# Patient Record
Sex: FEMALE | Race: OTHER | Employment: OTHER | URBAN - METROPOLITAN AREA
[De-identification: names, ages, dates, MRNs, and addresses within clinical notes are randomized per-mention and may not be internally consistent; named-entity substitution may affect disease eponyms.]

---

## 2018-03-17 ENCOUNTER — HOSPITAL ENCOUNTER (OUTPATIENT)
Age: 58
Discharge: HOME | End: 2018-03-17
Payer: COMMERCIAL

## 2018-03-17 DIAGNOSIS — E78.00 HYPERCHOLESTEROLEMIA: ICD-10-CM

## 2018-03-17 LAB
ABSOLUTE EOS #: 0 K/UL (ref 0–0.4)
ABSOLUTE IMMATURE GRANULOCYTE: NORMAL K/UL (ref 0–0.3)
ABSOLUTE LYMPH #: 2.2 K/UL (ref 1–4.8)
ABSOLUTE MONO #: 0.4 K/UL (ref 0.1–1.3)
ALBUMIN SERPL-MCNC: 4.4 G/DL (ref 3.5–5.2)
ALBUMIN/GLOBULIN RATIO: ABNORMAL (ref 1–2.5)
ALP BLD-CCNC: 75 U/L (ref 35–104)
ALT SERPL-CCNC: 12 U/L (ref 5–33)
ANION GAP SERPL CALCULATED.3IONS-SCNC: 11 MMOL/L (ref 9–17)
AST SERPL-CCNC: 18 U/L
BASOPHILS # BLD: 0 % (ref 0–2)
BASOPHILS ABSOLUTE: 0 K/UL (ref 0–0.2)
BILIRUB SERPL-MCNC: 0.35 MG/DL (ref 0.3–1.2)
BUN BLDV-MCNC: 19 MG/DL (ref 6–20)
BUN/CREAT BLD: ABNORMAL (ref 9–20)
CALCIUM SERPL-MCNC: 9.1 MG/DL (ref 8.6–10.4)
CHLORIDE BLD-SCNC: 101 MMOL/L (ref 98–107)
CHOLESTEROL/HDL RATIO: 3.7
CHOLESTEROL: 202 MG/DL
CO2: 29 MMOL/L (ref 20–31)
CREAT SERPL-MCNC: 0.64 MG/DL (ref 0.5–0.9)
DIFFERENTIAL TYPE: NORMAL
EOSINOPHILS RELATIVE PERCENT: 1 % (ref 0–4)
GFR AFRICAN AMERICAN: >60 ML/MIN
GFR NON-AFRICAN AMERICAN: >60 ML/MIN
GFR SERPL CREATININE-BSD FRML MDRD: ABNORMAL ML/MIN/{1.73_M2}
GFR SERPL CREATININE-BSD FRML MDRD: ABNORMAL ML/MIN/{1.73_M2}
GLUCOSE BLD-MCNC: 101 MG/DL (ref 70–99)
HCT VFR BLD CALC: 39.1 % (ref 36–46)
HDLC SERPL-MCNC: 55 MG/DL
HEMOGLOBIN: 12.8 G/DL (ref 12–16)
IMMATURE GRANULOCYTES: NORMAL %
LDL CHOLESTEROL: 125 MG/DL (ref 0–130)
LYMPHOCYTES # BLD: 43 % (ref 24–44)
MCH RBC QN AUTO: 27.9 PG (ref 26–34)
MCHC RBC AUTO-ENTMCNC: 32.8 G/DL (ref 31–37)
MCV RBC AUTO: 85 FL (ref 80–100)
MONOCYTES # BLD: 7 % (ref 1–7)
NRBC AUTOMATED: NORMAL PER 100 WBC
PDW BLD-RTO: 13.7 % (ref 11.5–14.9)
PLATELET # BLD: 353 K/UL (ref 150–450)
PLATELET ESTIMATE: NORMAL
PMV BLD AUTO: 8.4 FL (ref 6–12)
POTASSIUM SERPL-SCNC: 4.4 MMOL/L (ref 3.7–5.3)
RBC # BLD: 4.59 M/UL (ref 4–5.2)
RBC # BLD: NORMAL 10*6/UL
SEG NEUTROPHILS: 49 % (ref 36–66)
SEGMENTED NEUTROPHILS ABSOLUTE COUNT: 2.5 K/UL (ref 1.3–9.1)
SODIUM BLD-SCNC: 141 MMOL/L (ref 135–144)
TOTAL PROTEIN: 7.5 G/DL (ref 6.4–8.3)
TRIGL SERPL-MCNC: 109 MG/DL
VLDLC SERPL CALC-MCNC: ABNORMAL MG/DL (ref 1–30)
WBC # BLD: 5.1 K/UL (ref 3.5–11)
WBC # BLD: NORMAL 10*3/UL

## 2018-03-17 PROCEDURE — 85025 COMPLETE CBC W/AUTO DIFF WBC: CPT

## 2018-03-17 PROCEDURE — 80061 LIPID PANEL: CPT

## 2018-03-17 PROCEDURE — 36415 COLL VENOUS BLD VENIPUNCTURE: CPT

## 2018-03-17 PROCEDURE — 80053 COMPREHEN METABOLIC PANEL: CPT

## 2018-04-05 ENCOUNTER — HOSPITAL ENCOUNTER (OUTPATIENT)
Dept: WOMENS IMAGING | Age: 58
Discharge: HOME | End: 2018-04-07
Payer: COMMERCIAL

## 2018-04-05 DIAGNOSIS — Z12.31 SCREENING MAMMOGRAM, ENCOUNTER FOR: ICD-10-CM

## 2018-04-05 PROCEDURE — 77063 BREAST TOMOSYNTHESIS BI: CPT

## 2019-03-06 ENCOUNTER — HOSPITAL ENCOUNTER (OUTPATIENT)
Age: 59
Setting detail: SPECIMEN
Discharge: HOME | End: 2019-03-06
Payer: COMMERCIAL

## 2019-03-06 LAB
SEDIMENTATION RATE, ERYTHROCYTE: 14 MM (ref 0–20)
THYROXINE, FREE: 1.3 NG/DL (ref 0.93–1.7)
TSH SERPL DL<=0.05 MIU/L-ACNC: 1.5 MIU/L (ref 0.3–5)

## 2019-06-11 ENCOUNTER — HOSPITAL ENCOUNTER (OUTPATIENT)
Dept: MRI IMAGING | Facility: CLINIC | Age: 59
Discharge: HOME | End: 2019-06-13
Payer: COMMERCIAL

## 2019-06-11 DIAGNOSIS — M54.17 LUMBOSACRAL RADICULITIS: ICD-10-CM

## 2019-06-11 PROCEDURE — 72148 MRI LUMBAR SPINE W/O DYE: CPT

## 2020-09-09 ENCOUNTER — OFFICE VISIT (OUTPATIENT)
Dept: PODIATRY | Age: 60
End: 2020-09-09
Payer: COMMERCIAL

## 2020-09-09 VITALS — HEIGHT: 66 IN | BODY MASS INDEX: 24.11 KG/M2 | WEIGHT: 150 LBS

## 2020-09-09 PROCEDURE — L4360 PNEUMAT WALKING BOOT PRE CST: HCPCS | Performed by: PODIATRIST

## 2020-09-09 PROCEDURE — G8427 DOCREV CUR MEDS BY ELIG CLIN: HCPCS | Performed by: PODIATRIST

## 2020-09-09 PROCEDURE — 99203 OFFICE O/P NEW LOW 30 MIN: CPT | Performed by: PODIATRIST

## 2020-09-09 PROCEDURE — G8420 CALC BMI NORM PARAMETERS: HCPCS | Performed by: PODIATRIST

## 2020-09-09 PROCEDURE — 1036F TOBACCO NON-USER: CPT | Performed by: PODIATRIST

## 2020-09-09 PROCEDURE — 3017F COLORECTAL CA SCREEN DOC REV: CPT | Performed by: PODIATRIST

## 2020-09-09 ASSESSMENT — ENCOUNTER SYMPTOMS
DIARRHEA: 0
COLOR CHANGE: 0
VOMITING: 0
NAUSEA: 0
CONSTIPATION: 0

## 2020-10-01 ENCOUNTER — OFFICE VISIT (OUTPATIENT)
Dept: PODIATRY | Age: 60
End: 2020-10-01
Payer: COMMERCIAL

## 2020-10-01 VITALS — WEIGHT: 150 LBS | HEIGHT: 66 IN | BODY MASS INDEX: 24.11 KG/M2

## 2020-10-01 PROCEDURE — G8427 DOCREV CUR MEDS BY ELIG CLIN: HCPCS | Performed by: PODIATRIST

## 2020-10-01 PROCEDURE — G8484 FLU IMMUNIZE NO ADMIN: HCPCS | Performed by: PODIATRIST

## 2020-10-01 PROCEDURE — 1036F TOBACCO NON-USER: CPT | Performed by: PODIATRIST

## 2020-10-01 PROCEDURE — 99213 OFFICE O/P EST LOW 20 MIN: CPT | Performed by: PODIATRIST

## 2020-10-01 PROCEDURE — 3017F COLORECTAL CA SCREEN DOC REV: CPT | Performed by: PODIATRIST

## 2020-10-01 PROCEDURE — G8420 CALC BMI NORM PARAMETERS: HCPCS | Performed by: PODIATRIST

## 2020-10-01 ASSESSMENT — ENCOUNTER SYMPTOMS
DIARRHEA: 0
CONSTIPATION: 0
NAUSEA: 0
COLOR CHANGE: 0
VOMITING: 0

## 2020-10-01 NOTE — PROGRESS NOTES
swelling. Gastrointestinal: Negative for constipation, diarrhea, nausea and vomiting. Musculoskeletal: Positive for gait problem. Negative for arthralgias and joint swelling. Skin: Negative for color change, pallor, rash and wound. Neurological: Negative for weakness and numbness. Lower Extremity Physical Examination:     Vitals: There were no vitals filed for this visit. General: AAO x 3 in NAD. Vascular: DP and PT pulses palpable 2/4, Bilateral.  CFT <3 seconds, Bilateral.  Hair growth absent to the level of the digits, Bilateral.  Edema present, Right. Varicosities absent, Bilateral. Erythema present, Right    Neurological:  Sensation present to light touch to level of digits, Bilateral.      Musculoskeletal: Muscle strength guarded/5, Right. Pain present upon palpation of base of the 5th metatarsal , Right. normal medial longitudinal arch, Bilateral.  Ecchymosis right lateral foot and ankle    Integument: Warm, dry, supple, Bilateral.  Open lesion absent, Bilateral.      Radiographs: 3 views right Foot:  Nondisplaced fracture of the base of the fifth metatarsal. No further displacement      Asessment: Patient is a 60 y.o. female with:   1. Closed fracture of base of fifth metatarsal bone of right foot, initial encounter    2. Foot pain, right          Plan: Patient examined and evaluated. Current condition and treatment options discussed in detail. Advised pt to rest, ice, minimal walking. Verbal and written instructions given to patient. Contact office with any questions/problems/concerns. Continue CAM walker    Orders Placed This Encounter   Procedures    XR FOOT RIGHT (MIN 3 VIEWS)     No orders of the defined types were placed in this encounter. RTC in 3week(s).   Then possibly into a shoe  10/1/2020

## 2020-10-22 ENCOUNTER — OFFICE VISIT (OUTPATIENT)
Dept: PODIATRY | Age: 60
End: 2020-10-22
Payer: COMMERCIAL

## 2020-10-22 VITALS — WEIGHT: 150 LBS | BODY MASS INDEX: 24.11 KG/M2 | HEIGHT: 66 IN

## 2020-10-22 PROCEDURE — G8484 FLU IMMUNIZE NO ADMIN: HCPCS | Performed by: PODIATRIST

## 2020-10-22 PROCEDURE — 99213 OFFICE O/P EST LOW 20 MIN: CPT | Performed by: PODIATRIST

## 2020-10-22 PROCEDURE — 3017F COLORECTAL CA SCREEN DOC REV: CPT | Performed by: PODIATRIST

## 2020-10-22 PROCEDURE — G8427 DOCREV CUR MEDS BY ELIG CLIN: HCPCS | Performed by: PODIATRIST

## 2020-10-22 PROCEDURE — G8420 CALC BMI NORM PARAMETERS: HCPCS | Performed by: PODIATRIST

## 2020-10-22 PROCEDURE — 1036F TOBACCO NON-USER: CPT | Performed by: PODIATRIST

## 2020-10-22 ASSESSMENT — ENCOUNTER SYMPTOMS
NAUSEA: 0
DIARRHEA: 0
COLOR CHANGE: 0
VOMITING: 0
CONSTIPATION: 0

## 2020-10-22 NOTE — PROGRESS NOTES
[facility]  Return Patient History and Physical    Chief Complaint:   Chief Complaint   Patient presents with    Follow-up     Right foot, Pt states some twinges in foot, but felling better        HPI: Deejay Leyva is a 63 y.o. female who presents to the office today for follow up of right foot injury. She has been in the CAM walker for 6 weeks and feeling better, still having swelling, some pain in ankle. Tootie Canadasuad 9/2. Went to [facility]. xrays showed a fracture of the fifth metatarsal. Put her in a surgical shoe, she has been weight bearing. Pain a little better. Currently denies F/C/N/V. Allergies   Allergen Reactions    Latex        Past Medical History:   Diagnosis Date    Asthma, mild     Chronic low back pain     Eczema     Headache(784.0)     History of colon polyps 7/16/2015    History of herpes zoster     3.2015    Hyperlipidemia LDL goal < 130     Hyperplastic colon polyp     Migraine syndrome     Osteoarthritis     Retinitis pigmentosa     Rosacea     Shoulder bursitis     Spinal stenosis of lumbar region        Prior to Admission medications    Not on File       Past Surgical History:   Procedure Laterality Date    BUNIONECTOMY      CATARACT REMOVAL      COLONOSCOPY  7.21.11    DILATION AND CURETTAGE OF UTERUS      TONSILLECTOMY  1965       Family History   Problem Relation Age of Onset    Breast Cancer Other     Other Other         lymphoma    High Cholesterol Mother     Asthma Father     High Cholesterol Father     Other Father         transient ischemic attack    Other Maternal Grandmother         pulm fib/familial tremor       Social History     Tobacco Use    Smoking status: Never Smoker    Smokeless tobacco: Never Used   Substance Use Topics    Alcohol use: No     Alcohol/week: 0.0 standard drinks       Review of Systems   Constitutional: Negative for chills, diaphoresis, fatigue, fever and unexpected weight change.    Cardiovascular: Negative for leg swelling. Gastrointestinal: Negative for constipation, diarrhea, nausea and vomiting. Musculoskeletal: Positive for gait problem. Negative for arthralgias and joint swelling. Skin: Negative for color change, pallor, rash and wound. Neurological: Negative for weakness and numbness. Lower Extremity Physical Examination:     Vitals: There were no vitals filed for this visit. General: AAO x 3 in NAD. Vascular: DP and PT pulses palpable 2/4, Bilateral.  CFT <3 seconds, Bilateral.  Hair growth absent to the level of the digits, Bilateral.  Edema present, Right. Varicosities absent, Bilateral. Erythema present, Right    Neurological:  Sensation present to light touch to level of digits, Bilateral.      Musculoskeletal: Muscle strength guarded/5, Right. Still some Pain present upon palpation of base of the 5th metatarsal , Right. normal medial longitudinal arch, Bilateral.  Ecchymosis right lateral foot and ankle    Integument: Warm, dry, supple, Bilateral.  Open lesion absent, Bilateral.      Radiographs: 3 views right Foot:  Nondisplaced fracture of the base of the fifth metatarsal. No further displacement      Asessment: Patient is a 63 y.o. female with:   1. Closed fracture of base of fifth metatarsal bone of right foot, initial encounter    2. Foot pain, right          Plan: Patient examined and evaluated. Current condition and treatment options discussed in detail. Advised pt to rest, ice, minimal walking. Verbal and written instructions given to patient. Contact office with any questions/problems/concerns. Continue CAM walker  Slow transition to a sturdy shoe  compression around ankle  May need PT    Orders Placed This Encounter   Procedures    XR FOOT RIGHT (MIN 3 VIEWS)     No orders of the defined types were placed in this encounter.        RTC in as needed  10/22/2020

## 2020-10-30 ENCOUNTER — TELEPHONE (OUTPATIENT)
Dept: GASTROENTEROLOGY | Age: 60
End: 2020-10-30

## 2020-11-04 NOTE — TELEPHONE ENCOUNTER
Misa Acosta left voicemail message requesting call back to schedule a colonoscopy, states Dr Brooke Duran office should  should have sent referral yesterday via fax.

## 2020-11-18 RX ORDER — SODIUM, POTASSIUM,MAG SULFATES 17.5-3.13G
SOLUTION, RECONSTITUTED, ORAL ORAL
Qty: 1 BOTTLE | Refills: 0 | Status: SHIPPED | OUTPATIENT
Start: 2020-11-18 | End: 2021-03-25

## 2020-11-18 NOTE — TELEPHONE ENCOUNTER
Writer spoke to pt over the phone in regards to scheduling colon recall proc. Pt is sched christiano Elaine at [address] [place] [address] 12/22/20 @ 12pm proc time, 10am arrival time. Suprep order will be sent to [facility] on [place], [place]. Bowel prep instructions given to pt over the phone and hard copy mailed to pt's home address. Pt instructed she will need a . Covid testing is sched at [facility] 12/18/20 @ 3:20pm.  Pt voices her understanding.

## 2020-12-18 ENCOUNTER — HOSPITAL ENCOUNTER (OUTPATIENT)
Dept: LAB | Age: 60
Setting detail: SPECIMEN
Discharge: HOME | End: 2020-12-18
Payer: COMMERCIAL

## 2020-12-18 PROCEDURE — U0003 INFECTIOUS AGENT DETECTION BY NUCLEIC ACID (DNA OR RNA); SEVERE ACUTE RESPIRATORY SYNDROME CORONAVIRUS 2 (SARS-COV-2) (CORONAVIRUS DISEASE [COVID-19]), AMPLIFIED PROBE TECHNIQUE, MAKING USE OF HIGH THROUGHPUT TECHNOLOGIES AS DESCRIBED BY CMS-2020-01-R: HCPCS

## 2020-12-20 LAB
SARS-COV-2, RAPID: NORMAL
SARS-COV-2: NORMAL
SARS-COV-2: NOT DETECTED
SOURCE: NORMAL

## 2020-12-21 ENCOUNTER — TELEPHONE (OUTPATIENT)
Dept: PRIMARY CARE CLINIC | Age: 60
End: 2020-12-21

## 2020-12-21 ENCOUNTER — ANESTHESIA EVENT (OUTPATIENT)
Dept: ENDOSCOPY | Age: 60
End: 2020-12-21
Payer: COMMERCIAL

## 2020-12-22 ENCOUNTER — ANESTHESIA (OUTPATIENT)
Dept: ENDOSCOPY | Age: 60
End: 2020-12-22
Payer: COMMERCIAL

## 2020-12-22 ENCOUNTER — HOSPITAL ENCOUNTER (OUTPATIENT)
Age: 60
Setting detail: OUTPATIENT SURGERY
Discharge: HOME | End: 2020-12-22
Attending: INTERNAL MEDICINE | Admitting: INTERNAL MEDICINE
Payer: COMMERCIAL

## 2020-12-22 VITALS
DIASTOLIC BLOOD PRESSURE: 63 MMHG | BODY MASS INDEX: 24.91 KG/M2 | WEIGHT: 155 LBS | OXYGEN SATURATION: 100 % | SYSTOLIC BLOOD PRESSURE: 129 MMHG | RESPIRATION RATE: 16 BRPM | HEIGHT: 66 IN | TEMPERATURE: 96.8 F | HEART RATE: 70 BPM

## 2020-12-22 VITALS — SYSTOLIC BLOOD PRESSURE: 123 MMHG | OXYGEN SATURATION: 99 % | DIASTOLIC BLOOD PRESSURE: 72 MMHG

## 2020-12-22 PROCEDURE — 6360000002 HC RX W HCPCS

## 2020-12-22 PROCEDURE — 88305 TISSUE EXAM BY PATHOLOGIST: CPT

## 2020-12-22 PROCEDURE — 45390 COLONOSCOPY W/RESECTION: CPT | Performed by: INTERNAL MEDICINE

## 2020-12-22 PROCEDURE — 2720000010 HC SURG SUPPLY STERILE: Performed by: INTERNAL MEDICINE

## 2020-12-22 PROCEDURE — 7100000010 HC PHASE II RECOVERY - FIRST 15 MIN: Performed by: INTERNAL MEDICINE

## 2020-12-22 PROCEDURE — 7100000000 HC PACU RECOVERY - FIRST 15 MIN: Performed by: INTERNAL MEDICINE

## 2020-12-22 PROCEDURE — 7100000001 HC PACU RECOVERY - ADDTL 15 MIN: Performed by: INTERNAL MEDICINE

## 2020-12-22 PROCEDURE — 45385 COLONOSCOPY W/LESION REMOVAL: CPT | Performed by: INTERNAL MEDICINE

## 2020-12-22 PROCEDURE — 3700000001 HC ADD 15 MINUTES (ANESTHESIA): Performed by: INTERNAL MEDICINE

## 2020-12-22 PROCEDURE — 7100000011 HC PHASE II RECOVERY - ADDTL 15 MIN: Performed by: INTERNAL MEDICINE

## 2020-12-22 PROCEDURE — 3700000000 HC ANESTHESIA ATTENDED CARE: Performed by: INTERNAL MEDICINE

## 2020-12-22 PROCEDURE — 3609010600 HC COLONOSCOPY POLYPECTOMY SNARE/COLD BIOPSY: Performed by: INTERNAL MEDICINE

## 2020-12-22 PROCEDURE — 7100000030 HC ASPR PHASE II RECOVERY - FIRST 15 MIN: Performed by: INTERNAL MEDICINE

## 2020-12-22 PROCEDURE — 2709999900 HC NON-CHARGEABLE SUPPLY: Performed by: INTERNAL MEDICINE

## 2020-12-22 PROCEDURE — 2580000003 HC RX 258: Performed by: ANESTHESIOLOGY

## 2020-12-22 PROCEDURE — 2500000003 HC RX 250 WO HCPCS: Performed by: ANESTHESIOLOGY

## 2020-12-22 PROCEDURE — 7100000031 HC ASPR PHASE II RECOVERY - ADDTL 15 MIN: Performed by: INTERNAL MEDICINE

## 2020-12-22 PROCEDURE — 45384 COLONOSCOPY W/LESION REMOVAL: CPT | Performed by: INTERNAL MEDICINE

## 2020-12-22 RX ORDER — ONDANSETRON 2 MG/ML
4 INJECTION INTRAMUSCULAR; INTRAVENOUS
Status: DISCONTINUED | OUTPATIENT
Start: 2020-12-22 | End: 2020-12-22 | Stop reason: HOSPADM

## 2020-12-22 RX ORDER — HYDRALAZINE HYDROCHLORIDE 20 MG/ML
5 INJECTION INTRAMUSCULAR; INTRAVENOUS EVERY 10 MIN PRN
Status: DISCONTINUED | OUTPATIENT
Start: 2020-12-22 | End: 2020-12-22 | Stop reason: HOSPADM

## 2020-12-22 RX ORDER — 0.9 % SODIUM CHLORIDE 0.9 %
500 INTRAVENOUS SOLUTION INTRAVENOUS
Status: DISCONTINUED | OUTPATIENT
Start: 2020-12-22 | End: 2020-12-22 | Stop reason: HOSPADM

## 2020-12-22 RX ORDER — PROPOFOL 10 MG/ML
INJECTION, EMULSION INTRAVENOUS PRN
Status: DISCONTINUED | OUTPATIENT
Start: 2020-12-22 | End: 2020-12-22 | Stop reason: SDUPTHER

## 2020-12-22 RX ORDER — LIDOCAINE HYDROCHLORIDE 10 MG/ML
1 INJECTION, SOLUTION EPIDURAL; INFILTRATION; INTRACAUDAL; PERINEURAL
Status: COMPLETED | OUTPATIENT
Start: 2020-12-22 | End: 2020-12-22

## 2020-12-22 RX ORDER — OMEGA-3 FATTY ACIDS/FISH OIL 300-1000MG
CAPSULE ORAL
COMMUNITY
End: 2021-06-11 | Stop reason: ALTCHOICE

## 2020-12-22 RX ORDER — SODIUM CHLORIDE 0.9 % (FLUSH) 0.9 %
10 SYRINGE (ML) INJECTION EVERY 12 HOURS SCHEDULED
Status: DISCONTINUED | OUTPATIENT
Start: 2020-12-22 | End: 2020-12-22 | Stop reason: HOSPADM

## 2020-12-22 RX ORDER — SODIUM CHLORIDE, SODIUM LACTATE, POTASSIUM CHLORIDE, CALCIUM CHLORIDE 600; 310; 30; 20 MG/100ML; MG/100ML; MG/100ML; MG/100ML
INJECTION, SOLUTION INTRAVENOUS CONTINUOUS
Status: DISCONTINUED | OUTPATIENT
Start: 2020-12-22 | End: 2020-12-22 | Stop reason: HOSPADM

## 2020-12-22 RX ORDER — DIPHENHYDRAMINE HYDROCHLORIDE 50 MG/ML
12.5 INJECTION INTRAMUSCULAR; INTRAVENOUS
Status: DISCONTINUED | OUTPATIENT
Start: 2020-12-22 | End: 2020-12-22 | Stop reason: HOSPADM

## 2020-12-22 RX ORDER — SODIUM CHLORIDE 0.9 % (FLUSH) 0.9 %
10 SYRINGE (ML) INJECTION PRN
Status: DISCONTINUED | OUTPATIENT
Start: 2020-12-22 | End: 2020-12-22 | Stop reason: HOSPADM

## 2020-12-22 RX ORDER — PROMETHAZINE HYDROCHLORIDE 25 MG/ML
6.25 INJECTION, SOLUTION INTRAMUSCULAR; INTRAVENOUS
Status: DISCONTINUED | OUTPATIENT
Start: 2020-12-22 | End: 2020-12-22 | Stop reason: HOSPADM

## 2020-12-22 RX ORDER — LABETALOL HYDROCHLORIDE 5 MG/ML
5 INJECTION, SOLUTION INTRAVENOUS EVERY 10 MIN PRN
Status: DISCONTINUED | OUTPATIENT
Start: 2020-12-22 | End: 2020-12-22 | Stop reason: HOSPADM

## 2020-12-22 RX ADMIN — PROPOFOL 40 MG: 10 INJECTION, EMULSION INTRAVENOUS at 11:52

## 2020-12-22 RX ADMIN — PROPOFOL 30 MG: 10 INJECTION, EMULSION INTRAVENOUS at 11:54

## 2020-12-22 RX ADMIN — PROPOFOL 20 MG: 10 INJECTION, EMULSION INTRAVENOUS at 11:58

## 2020-12-22 RX ADMIN — PROPOFOL 30 MG: 10 INJECTION, EMULSION INTRAVENOUS at 11:44

## 2020-12-22 RX ADMIN — PROPOFOL 30 MG: 10 INJECTION, EMULSION INTRAVENOUS at 12:14

## 2020-12-22 RX ADMIN — PROPOFOL 30 MG: 10 INJECTION, EMULSION INTRAVENOUS at 11:38

## 2020-12-22 RX ADMIN — PROPOFOL 20 MG: 10 INJECTION, EMULSION INTRAVENOUS at 11:45

## 2020-12-22 RX ADMIN — PROPOFOL 30 MG: 10 INJECTION, EMULSION INTRAVENOUS at 11:42

## 2020-12-22 RX ADMIN — PROPOFOL 20 MG: 10 INJECTION, EMULSION INTRAVENOUS at 12:02

## 2020-12-22 RX ADMIN — SODIUM CHLORIDE, POTASSIUM CHLORIDE, SODIUM LACTATE AND CALCIUM CHLORIDE: 600; 310; 30; 20 INJECTION, SOLUTION INTRAVENOUS at 10:52

## 2020-12-22 RX ADMIN — PROPOFOL 30 MG: 10 INJECTION, EMULSION INTRAVENOUS at 11:36

## 2020-12-22 RX ADMIN — PROPOFOL 30 MG: 10 INJECTION, EMULSION INTRAVENOUS at 11:43

## 2020-12-22 RX ADMIN — PROPOFOL 30 MG: 10 INJECTION, EMULSION INTRAVENOUS at 12:11

## 2020-12-22 RX ADMIN — PROPOFOL 20 MG: 10 INJECTION, EMULSION INTRAVENOUS at 12:12

## 2020-12-22 RX ADMIN — LIDOCAINE HYDROCHLORIDE 50 MG: 10 INJECTION, SOLUTION EPIDURAL; INFILTRATION; INTRACAUDAL; PERINEURAL at 11:29

## 2020-12-22 RX ADMIN — PROPOFOL 50 MG: 10 INJECTION, EMULSION INTRAVENOUS at 11:29

## 2020-12-22 RX ADMIN — PROPOFOL 20 MG: 10 INJECTION, EMULSION INTRAVENOUS at 11:56

## 2020-12-22 RX ADMIN — PROPOFOL 20 MG: 10 INJECTION, EMULSION INTRAVENOUS at 12:05

## 2020-12-22 RX ADMIN — PROPOFOL 20 MG: 10 INJECTION, EMULSION INTRAVENOUS at 12:10

## 2020-12-22 RX ADMIN — PROPOFOL 30 MG: 10 INJECTION, EMULSION INTRAVENOUS at 12:01

## 2020-12-22 RX ADMIN — PROPOFOL 30 MG: 10 INJECTION, EMULSION INTRAVENOUS at 11:37

## 2020-12-22 RX ADMIN — PROPOFOL 30 MG: 10 INJECTION, EMULSION INTRAVENOUS at 11:40

## 2020-12-22 RX ADMIN — PROPOFOL 20 MG: 10 INJECTION, EMULSION INTRAVENOUS at 11:39

## 2020-12-22 RX ADMIN — PROPOFOL 30 MG: 10 INJECTION, EMULSION INTRAVENOUS at 11:31

## 2020-12-22 RX ADMIN — PROPOFOL 30 MG: 10 INJECTION, EMULSION INTRAVENOUS at 11:33

## 2020-12-22 RX ADMIN — PROPOFOL 30 MG: 10 INJECTION, EMULSION INTRAVENOUS at 11:35

## 2020-12-22 RX ADMIN — PROPOFOL 20 MG: 10 INJECTION, EMULSION INTRAVENOUS at 12:08

## 2020-12-22 RX ADMIN — PROPOFOL 30 MG: 10 INJECTION, EMULSION INTRAVENOUS at 11:41

## 2020-12-22 RX ADMIN — PROPOFOL 20 MG: 10 INJECTION, EMULSION INTRAVENOUS at 11:30

## 2020-12-22 RX ADMIN — PROPOFOL 20 MG: 10 INJECTION, EMULSION INTRAVENOUS at 11:49

## 2020-12-22 RX ADMIN — PROPOFOL 20 MG: 10 INJECTION, EMULSION INTRAVENOUS at 12:15

## 2020-12-22 RX ADMIN — PROPOFOL 20 MG: 10 INJECTION, EMULSION INTRAVENOUS at 11:46

## 2020-12-22 RX ADMIN — PROPOFOL 20 MG: 10 INJECTION, EMULSION INTRAVENOUS at 11:47

## 2020-12-22 ASSESSMENT — PULMONARY FUNCTION TESTS
PIF_VALUE: 1
PIF_VALUE: 0
PIF_VALUE: 0
PIF_VALUE: 1
PIF_VALUE: 0
PIF_VALUE: 1
PIF_VALUE: 1
PIF_VALUE: 0
PIF_VALUE: 1
PIF_VALUE: 0
PIF_VALUE: 1
PIF_VALUE: 0
PIF_VALUE: 1
PIF_VALUE: 0
PIF_VALUE: 0
PIF_VALUE: 1
PIF_VALUE: 0
PIF_VALUE: 0
PIF_VALUE: 1
PIF_VALUE: 0
PIF_VALUE: 1
PIF_VALUE: 1
PIF_VALUE: 0
PIF_VALUE: 0
PIF_VALUE: 1
PIF_VALUE: 0
PIF_VALUE: 1
PIF_VALUE: 0
PIF_VALUE: 1
PIF_VALUE: 0
PIF_VALUE: 1
PIF_VALUE: 0
PIF_VALUE: 0
PIF_VALUE: 1
PIF_VALUE: 0
PIF_VALUE: 1
PIF_VALUE: 0
PIF_VALUE: 0
PIF_VALUE: 1
PIF_VALUE: 0
PIF_VALUE: 1
PIF_VALUE: 0
PIF_VALUE: 1
PIF_VALUE: 0
PIF_VALUE: 1

## 2020-12-22 ASSESSMENT — PAIN SCALES - GENERAL
PAINLEVEL_OUTOF10: 0
PAINLEVEL_OUTOF10: 2
PAINLEVEL_OUTOF10: 0
PAINLEVEL_OUTOF10: 0

## 2020-12-22 ASSESSMENT — PAIN - FUNCTIONAL ASSESSMENT: PAIN_FUNCTIONAL_ASSESSMENT: 0-10

## 2020-12-22 NOTE — H&P
Occupational History    Not on file   Social Needs    Financial resource strain: Not on file    Food insecurity     Worry: Not on file     Inability: Not on file    Transportation needs     Medical: Not on file     Non-medical: Not on file   Tobacco Use    Smoking status: Never Smoker    Smokeless tobacco: Never Used   Substance and Sexual Activity    Alcohol use: No     Alcohol/week: 0.0 standard drinks    Drug use: No    Sexual activity: Not on file   Lifestyle    Physical activity     Days per week: Not on file     Minutes per session: Not on file    Stress: Not on file   Relationships    Social connections     Talks on phone: Not on file     Gets together: Not on file     Attends Confucianist service: Not on file     Active member of club or organization: Not on file     Attends meetings of clubs or organizations: Not on file     Relationship status: Not on file    Intimate partner violence     Fear of current or ex partner: Not on file     Emotionally abused: Not on file     Physically abused: Not on file     Forced sexual activity: Not on file   Other Topics Concern    Not on file   Social History Narrative    Not on file        REVIEW OF SYSTEMS      Allergies   Allergen Reactions    Latex        No current facility-administered medications on file prior to encounter. Current Outpatient Medications on File Prior to Encounter   Medication Sig Dispense Refill    Na Sulfate-K Sulfate-Mg Sulf 17.5-3.13-1.6 GM/177ML SOLN Use as directed in your patient instructions. 1 Bottle 0   Notation: Above medications are not currently reconciled at time of signing this H&P note, to be reconciled in pre-op per RN. Negative except for what is mentioned in the HPI. GENERAL PHYSICAL EXAM     Vitals: Review vitals per RN flowsheet. GENERAL APPEARANCE:   Matt Kaur is 63 y.o.,  female, not obese, nourished, conscious, alert. Does not appear to be in distress or pain at this time. SKIN:  Warm, dry, no cyanosis or jaundice. HEAD:  Normocephalic, atraumatic. EYES:  Legally blind in both eyes. Pupils equal.            EARS:  No discharge, no marked hearing loss. NOSE:  No rhinorrhea, epistaxis or septal deformity. THROAT:  Not congested. No ulceration bleeding or discharge. NECK:  No stiffness, trachea central.  No palpable masses or L.N.                 CHEST:  Symmetrical and equal on expansion. HEART:  RRR S1 > S2. No audible murmurs or gallops. LUNGS:  Equal on expansion, normal breath sounds. No wheezing rhonchi or rales. .           ABDOMEN:  Soft on palpation. No localized tenderness. No guarding or rigidity. LOCOMOTOR, BACK AND SPINE:  No tenderness or deformities. EXTREMITIES:  Symmetrical, no pretibial edema. No calf tenderness. No discoloration or ulcerations. NEUROLOGIC:  The patient is conscious, alert, oriented. No facial drooping. Speech clear. No apparent focal sensory or motor deficits.              PROVISIONAL DIAGNOSES / SURGERY:      HX OF COLON POLYPS, COLON CANCER SCREENING     COLORECTAL CANCER SCREENING, HIGH RISK    Patient Active Problem List    Diagnosis Date Noted    BMI 23.0-23.9, adult 03/09/2018    Fatigue 07/16/2015    History of colon polyps 07/16/2015    Asthma, mild     Chronic low back pain     Retinitis pigmentosa     Migraine syndrome     History of herpes zoster     Eczema     Rosacea     Spinal stenosis of lumbar region     Shoulder bursitis     Hypercholesterolemia     Osteoarthritis            Claudell Post, APRN - CNP on 12/22/2020 at 10:09 AM

## 2020-12-22 NOTE — ANESTHESIA PRE PROCEDURE
Department of Anesthesiology  Preprocedure Note       Name:  Lakeisha Cagle   Age:  63 y.o.  :  1960                                          MRN:  [id number]         Date:  2020      Surgeon: Ruby Holloway):  Elisabeth Connell MD    Procedure: Procedure(s):  COLORECTAL CANCER SCREENING, HIGH RISK    Medications prior to admission:   Prior to Admission medications    Medication Sig Start Date End Date Taking? Authorizing Provider   Na Sulfate-K Sulfate-Mg Sulf 17.5-3.13-1.6 GM/177ML SOLN Use as directed in your patient instructions. 20   Elisabeth Connell MD       Current medications:    Current Facility-Administered Medications   Medication Dose Route Frequency Provider Last Rate Last Admin    sodium chloride flush 0.9 % injection 10 mL  10 mL Intravenous 2 times per day Carla Miranda MD        sodium chloride flush 0.9 % injection 10 mL  10 mL Intravenous PRN Carla Miranda MD        lidocaine PF 1 % injection 1 mL  1 mL Intradermal Once PRN Carla Miranda MD        lactated ringers infusion   Intravenous Continuous Carla Miranda MD           Allergies:     Allergies   Allergen Reactions    Latex        Problem List:    Patient Active Problem List   Diagnosis Code    Hypercholesterolemia E78.00    Osteoarthritis M19.90    Asthma, mild J45.909    Chronic low back pain M54.5, G89.29    Retinitis pigmentosa H35.52    Migraine syndrome G43.909    History of herpes zoster Z86.19    Eczema L30.9    Rosacea L71.9    Spinal stenosis of lumbar region M48.061    Shoulder bursitis M75.50    Fatigue R53.83    History of colon polyps Z86.010    BMI 23.0-23.9, adult Z68.23       Past Medical History:        Diagnosis Date    Asthma, mild     Chronic low back pain     Eczema     Headache(784.0)     History of colon polyps 2015    History of herpes zoster     3.2015    Hyperlipidemia LDL goal < 130     Hyperplastic colon polyp     Migraine syndrome     Osteoarthritis     Retinitis pigmentosa  Rosacea     Shoulder bursitis     Spinal stenosis of lumbar region        Past Surgical History:        Procedure Laterality Date    BUNIONECTOMY      CATARACT REMOVAL      COLONOSCOPY  7.21.11    DILATION AND CURETTAGE OF UTERUS      TONSILLECTOMY  1965       Social History:    Social History     Tobacco Use    Smoking status: Never Smoker    Smokeless tobacco: Never Used   Substance Use Topics    Alcohol use: No     Alcohol/week: 0.0 standard drinks                                Counseling given: Not Answered      Vital Signs (Current): There were no vitals filed for this visit. BP Readings from Last 3 Encounters:   03/09/18 98/62   12/27/17 118/70   07/27/16 122/66       NPO Status:                                                                                 BMI:   Wt Readings from Last 3 Encounters:   10/22/20 150 lb (68 kg)   10/01/20 150 lb (68 kg)   09/09/20 150 lb (68 kg)     There is no height or weight on file to calculate BMI.    CBC:   Lab Results   Component Value Date    WBC 5.1 03/17/2018    RBC 4.59 03/17/2018    HGB 12.8 03/17/2018    HCT 39.1 03/17/2018    MCV 85.0 03/17/2018    RDW 13.7 03/17/2018     03/17/2018       CMP:   Lab Results   Component Value Date     03/17/2018    K 4.4 03/17/2018     03/17/2018    CO2 29 03/17/2018    BUN 19 03/17/2018    CREATININE 0.64 03/17/2018    GFRAA >60 03/17/2018    LABGLOM >60 03/17/2018    GLUCOSE 101 03/17/2018    PROT 7.5 03/17/2018    CALCIUM 9.1 03/17/2018    BILITOT 0.35 03/17/2018    ALKPHOS 75 03/17/2018    AST 18 03/17/2018    ALT 12 03/17/2018       POC Tests: No results for input(s): POCGLU, POCNA, POCK, POCCL, POCBUN, POCHEMO, POCHCT in the last 72 hours.     Coags: No results found for: PROTIME, INR, APTT    HCG (If Applicable): No results found for: PREGTESTUR, PREGSERUM, HCG, HCGQUANT     ABGs: No results found for: PHART, PO2ART, PZY8GVA, VVK3ZUR, BEART, A4KJUXSP Type & Screen (If Applicable):  No results found for: LABABO, LABRH    Drug/Infectious Status (If Applicable):  No results found for: HIV, HEPCAB    COVID-19 Screening (If Applicable):   Lab Results   Component Value Date    COVID19 Not Detected 12/18/2020         Anesthesia Evaluation  Patient summary reviewed and Nursing notes reviewed no history of anesthetic complications:   Airway: Mallampati: II  TM distance: >3 FB   Neck ROM: full  Mouth opening: > = 3 FB Dental: normal exam         Pulmonary:normal exam  breath sounds clear to auscultation  (+) asthma:                            Cardiovascular:Negative CV ROS  Exercise tolerance: good (>4 METS),           Rhythm: regular  Rate: normal                    Neuro/Psych:   (+) neuromuscular disease (chronic back pain):, headaches: migraine headaches,             GI/Hepatic/Renal:   (+) bowel prep,           Endo/Other:    (+) : arthritis: OA., .                 Abdominal:           Vascular:                                      Anesthesia Plan      MAC     ASA 2       Induction: intravenous. MIPS: Prophylactic antiemetics administered. Anesthetic plan and risks discussed with patient. Plan discussed with CRNA.                   Dorothy Corona MD   12/22/2020

## 2020-12-22 NOTE — OP NOTE
Operative Note    PROCEDURE NOTE    DATE OF PROCEDURE: 12/22/2020    SURGEON: Ainsley Murphy MD  Facility : [facility]  ASSISTANT: None  Anesthesia: MAc   PREOPERATIVE DIAGNOSIS:   History of polyps    POSTOPERATIVE DIAGNOSIS: as described below    OPERATION: Total colonoscopy     ANESTHESIA: Moderate Sedation    ESTIMATED BLOOD LOSS: less than 50     COMPLICATIONS: None. SPECIMENS:  Was Obtained:     Cecal flat lesion measuring 2 cm I have to lift it with Orise gel, and then removed it with the stiff snare in an EMR fashion, closed the mucosal defect with 3 clips      Another lesion above the ileocecal valve in the right colon measuring 1.5 cm riding a fold  Removed with the stiff snare also, and closed with 2 clips      Transverse colon polyp riding a fold, cauterized piece by piece with a hot biopsy forceps      Sigmoid polyp 1 cm removed with the snare              HISTORY: The patient is a 63y.o. year old female with history of above preop diagnosis. I recommended colonoscopy with possible biopsy or polypectomy and I explained the risk, benefits, expected outcome, and alternatives to the procedure. Risks included but are not limited to bleeding, infection, respiratory distress, hypotension, and perforation of the colon and possibility of missing a lesion. The patient understands and is in agreement. The patient was counseled at length about the risks of donald Covid-19 during their perioperative period and any recovery window from their procedure. The patient was made aware that donald Covid-19  may worsen their prognosis for recovering from their procedure  and lend to a higher morbidity and/or mortality risk. All material risks, benefits, and reasonable alternatives including postponing the procedure were discussed. The patient does wish to proceed with the procedure at this time. PROCEDURE: The patient was given IV conscious sedation.   The patient's SPO2 remained above 90% throughout the procedure. The colonoscope was inserted per rectum and advanced under direct vision to the cecum without difficulty. Post sedation note : The patient's SPO2 remained above 90% throughout the procedure. the vital signs remained stable , and no immediate complication form the procedure noted, patient will be ready for d/c when criteria is met . The prep was good. Findings:  Terminal ileum: normal    Colon:    Cecal flat lesion measuring 2 cm I have to lift it with Orise gel, and then removed it with the stiff snare in an EMR fashion, closed the mucosal defect with 3 clips      Another lesion above the ileocecal valve in the right colon measuring 1.5 cm riding a fold  Removed with the stiff snare also, and closed with 2 clips      Transverse colon polyp riding a fold, cauterized piece by piece with a hot biopsy forceps      Sigmoid polyp 1 cm removed with the snare      Rare diverticula      Some hemorrhoids      Withdrawal Time was (minutes): 28    The colon was decompressed and the scope was removed. The patient tolerated the procedure well. Recommendations/Plan:   1. Lifestyle and dietary modifications as discussed  2. F/U Biopsies  3. F/U In OfficeYes  4. Discussed with the family  5.  Repeat colonoscopy in6 months     Electronically signed by Fran Pizano MD  on 12/22/2020 at 12:21 PM

## 2020-12-22 NOTE — ANESTHESIA POSTPROCEDURE EVALUATION
Department of Anesthesiology  Postprocedure Note    Patient: Gely Lewis  MRN: [id number]  YOB: 1960  Date of evaluation: 12/22/2020  Time:  1:24 PM     Procedure Summary     Date: 12/22/20 Room / Location: [address] [place] [address] ENDO 04 / [facility] ENDO    Anesthesia Start: 1117 Anesthesia Stop: 4378    Procedure: COLONOSCOPY POLYPECTOMY SNARE HOT BIOPSY WITH EMR AND CLIP APPLICATION (N/A Anus) Diagnosis: (HX OF COLON POLYPS, COLON CANCER SCREENING (PAT PER ANESTHESIA ON ADMIT))    Surgeons: Yumiko Agrawal MD Responsible Provider: Na Carter MD    Anesthesia Type: MAC ASA Status: 2          Anesthesia Type: MAC    Jorge Phase I: Jorge Score: 10    Jorge Phase II:      Last vitals: Reviewed and per EMR flowsheets.        Anesthesia Post Evaluation    Comments: POST- ANESTHESIA EVALUATION       Pt Name: Gely Lewis  MRN: [id number]  YOB: 1960  Date of evaluation: 12/22/2020  Time:  1:25 PM      /62   Pulse 56   Temp 97.6 °F (36.4 °C)   Resp 15   Ht 5' 6\" (1.676 m)   Wt 155 lb (70.3 kg)   SpO2 98%   BMI 25.02 kg/m²      Consciousness Level  Awake  Cardiopulmonary Status  Stable  Pain Adequately Treated YES  Nausea / Vomiting  NO  Adequate Hydration  YES  Anesthesia Related Complications NONE      Electronically signed by Na Carter MD on 12/22/2020 at 1:25 PM

## 2020-12-23 LAB — SURGICAL PATHOLOGY REPORT: NORMAL

## 2020-12-30 ENCOUNTER — TELEPHONE (OUTPATIENT)
Dept: GASTROENTEROLOGY | Age: 60
End: 2020-12-30

## 2020-12-30 NOTE — TELEPHONE ENCOUNTER
Writer spoke with patient. Path report will be discussed with Dr Porsha Gomez next week when he returns to office regarding f/u.

## 2021-01-07 NOTE — TELEPHONE ENCOUNTER
Patient called following up on return call after discussing path report with Dr. Princess Sterling. Patient asked to be called back at [phone].

## 2021-03-25 ENCOUNTER — VIRTUAL VISIT (OUTPATIENT)
Dept: GASTROENTEROLOGY | Age: 61
End: 2021-03-25
Payer: COMMERCIAL

## 2021-03-25 DIAGNOSIS — D12.6 SERRATED ADENOMA OF COLON: Primary | ICD-10-CM

## 2021-03-25 PROCEDURE — G8484 FLU IMMUNIZE NO ADMIN: HCPCS | Performed by: INTERNAL MEDICINE

## 2021-03-25 PROCEDURE — G8427 DOCREV CUR MEDS BY ELIG CLIN: HCPCS | Performed by: INTERNAL MEDICINE

## 2021-03-25 PROCEDURE — 1036F TOBACCO NON-USER: CPT | Performed by: INTERNAL MEDICINE

## 2021-03-25 PROCEDURE — 99214 OFFICE O/P EST MOD 30 MIN: CPT | Performed by: INTERNAL MEDICINE

## 2021-03-25 PROCEDURE — G8419 CALC BMI OUT NRM PARAM NOF/U: HCPCS | Performed by: INTERNAL MEDICINE

## 2021-03-25 PROCEDURE — 3017F COLORECTAL CA SCREEN DOC REV: CPT | Performed by: INTERNAL MEDICINE

## 2021-03-25 ASSESSMENT — ENCOUNTER SYMPTOMS
BLOOD IN STOOL: 0
WHEEZING: 0
CONSTIPATION: 0
DIARRHEA: 0
ABDOMINAL PAIN: 0
VOMITING: 0
ANAL BLEEDING: 0
ABDOMINAL DISTENTION: 0
RECTAL PAIN: 0
CHOKING: 0
NAUSEA: 0
COUGH: 1
TROUBLE SWALLOWING: 0

## 2021-03-25 NOTE — PROGRESS NOTES
Hilaria James is a 63 y.o. female evaluated via telephone on 3/25/2021. Consent:  She and/or health care decision maker is aware that that she may receive a bill for this telephone service, depending on her insurance coverage, and has provided verbal consent to proceed: Yes      GI  FOLLOW UP    INTERVAL HISTORY:   No referring provider defined for this encounter. Chief Complaint   Patient presents with    Follow-up     Patient is f/u on colo screen. She states 1 day after procedure she did have some abd pain. HISTORY OF PRESENT ILLNESS: Sveta Deutsch is a 63 y.o. female , referred for evaluation of*serrated adenomas  This patient had a colonoscopy screening on 1220-second  Result as below she did have flat cecal lesion measuring 2 cm which needed to be lifted and removed in an EMR fashion with another 1 which was smaller above the ileocecal valve also was removed with the EMR both were clipped  In addition to that there is sigmoid polyp and there is transverse colon polyps which were also removed  The plan on this patient is to go back and document complete resection because of the difficulty removing those lesions which are very flat and overriding falls  The patient herself denied any symptoms she said she did have a little bit of pain after the procedure for a couple days which resolved spontaneously did not have any bleeding have any fever did not have any nausea vomiting  Denied any black stool or blood in the stool  Denied any abdominal pain denied any weight loss  Reviewed her labs and imaging no recent labs      Past Medical,Family, and Social History reviewed and does contribute to the patient presentingcondition. Patient's PMH/PSH,SH,PSYCH Hx, MEDs, ALLERGIES, and ROS were all reviewed and updated in the appropriate sections.     PAST MEDICAL HISTORY:  Past Medical History:   Diagnosis Date    Asthma, mild     Chronic low back pain     Eczema     Foot fracture, right 09/2020    Headache(784.0)     History of colon polyps 7/16/2015    History of herpes zoster     3.2015    Hyperlipidemia LDL goal < 130     Hyperplastic colon polyp     Migraine syndrome     Osteoarthritis     Retinitis pigmentosa     Rosacea     Shoulder bursitis     Spinal stenosis of lumbar region        Past Surgical History:   Procedure Laterality Date    BUNIONECTOMY      CATARACT REMOVAL      COLONOSCOPY  7.21.11    COLONOSCOPY N/A 12/22/2020    COLONOSCOPY POLYPECTOMY SNARE HOT BIOPSY WITH EMR AND CLIP APPLICATION performed by Dayan Copeland MD at [address] ARTHROSCOPY Left     TONSILLECTOMY  1965       CURRENT MEDICATIONS:    Current Outpatient Medications:     ibuprofen (ADVIL) 200 MG CAPS, 1 tablet with food or milk as needed, Disp: , Rfl:     NONFORMULARY, 1 tablet 4 times daily Green gold multi vit, Disp: , Rfl:     ALLERGIES:   Allergies   Allergen Reactions    Latex        FAMILY HISTORY:       Problem Relation Age of Onset    Breast Cancer Other     Other Other         lymphoma    High Cholesterol Mother     Asthma Father     High Cholesterol Father     Other Father         transient ischemic attack    Other Maternal Grandmother         pulm fib/familial tremor         SOCIAL HISTORY:   Social History     Socioeconomic History    Marital status: Single     Spouse name: Not on file    Number of children: Not on file    Years of education: Not on file    Highest education level: Not on file   Occupational History    Not on file   Social Needs    Financial resource strain: Not on file    Food insecurity     Worry: Not on file     Inability: Not on file    Transportation needs     Medical: Not on file     Non-medical: Not on file   Tobacco Use    Smoking status: Never Smoker    Smokeless tobacco: Never Used   Substance and Sexual Activity    Alcohol use: No     Alcohol/week: 0.0 standard drinks    Drug use: No    Sexual activity: Not on file   Lifestyle    Physical activity     Days per week: Not on file     Minutes per session: Not on file    Stress: Not on file   Relationships    Social connections     Talks on phone: Not on file     Gets together: Not on file     Attends [employer] service: Not on file     Active member of club or organization: Not on file     Attends meetings of clubs or organizations: Not on file     Relationship status: Not on file    Intimate partner violence     Fear of current or ex partner: Not on file     Emotionally abused: Not on file     Physically abused: Not on file     Forced sexual activity: Not on file   Other Topics Concern    Not on file   Social History Narrative    Not on file       REVIEW OF SYSTEMS: A 12-point review of systemswas obtained and pertinent positives and negatives were enumerated above in the history of present illness. All other reviewed systems / symptoms were negative. Review of Systems   Constitutional: Negative for appetite change, fatigue and unexpected weight change. HENT: Negative for trouble swallowing. Respiratory: Positive for cough (asthma). Negative for choking and wheezing. Cardiovascular: Negative for chest pain, palpitations and leg swelling. Gastrointestinal: Negative for abdominal distention, abdominal pain, anal bleeding, blood in stool, constipation, diarrhea, nausea, rectal pain and vomiting. Genitourinary: Negative for difficulty urinating. Allergic/Immunologic: Negative for environmental allergies and food allergies. Neurological: Negative for dizziness, weakness, light-headedness, numbness and headaches. Hematological: Bruises/bleeds easily. Psychiatric/Behavioral: Negative for sleep disturbance. The patient is not nervous/anxious.             LABORATORY DATA: Reviewed  Lab Results   Component Value Date    WBC 5.1 03/17/2018    HGB 12.8 03/17/2018    HCT 39.1 03/17/2018    MCV 85.0 03/17/2018     03/17/2018    NA [x] No significant exanthematous lesions or discoloration noted on facial skin         [] Abnormal-            Psychiatric:       [x] Normal Affect [x] No Hallucinations        [] Abnormal-     Other pertinent observable physical exam findings-         IMPRESSION: Ms. Yoav Canales is a 63 y.o. female with      Diagnosis Orders   1. Serrated adenoma of colon  COLONOSCOPY W/ OR W/O BIOPSY    Comp Metabolic w Bili Profile    CBC Auto Differential       Diet was explained  The adenoma colon cancer sequence was explained  We will proceed with repeat colonoscopy and confirm complete eradication of those lesions  We will get some basic labs          Diet/life style/natural hx /complication of the dx were all explained in details   Past medical, past surgical, social history, psychiatric history, medications or allergies, all reviewed and  updated    Brynn Morrissey is a 63 y.o. female being evaluated by a Virtual Visit (video visit) encounter to address concerns as mentioned above. A caregiver was present when appropriate. Due to this being a TeleHealth encounter (During UPXGN-80 public health emergency), evaluation of the following organ systems was limited: Vitals/Constitutional/EENT/Resp/CV/GI//MS/Neuro/Skin/Heme-Lymph-Imm. Pursuant to the emergency declaration under the [facility], [address] [place] [address] authority and the [employer] and [employer] Act, this Virtual Visit was conducted with patient's (and/or legal guardian's) consent, to reduce the patient's risk of exposure to COVID-19 and provide necessary medical care. The patient (and/or legal guardian) has also been advised to contact this office for worsening conditions or problems, and seek emergency medical treatment and/or call 911 if deemed necessary. Services were provided through a video synchronous discussion virtually to substitute for in-person clinic visit.  Patient and provider were located at their individual homes. -- on 3/25/2021 at 5:03 PM    Total Time: minutes: 21-30 minutes    Services were provided through a video synchronous discussion virtually to substitute for in-person clinic visit. Thank you for allowing me to participate in the care of Ms. Reynolds. For any further questions please do not hesitate to contact me. I have reviewed and agree with the ROS entered by the MA/RN. Note is dictated utilizing voice recognition software. Unfortunately this leads to occasional typographical errors. Please contact our office if you have any questions. An electronic signature was used to authenticate this note.         Valentino Eon, MD  [facility]  O: #[phone]

## 2021-03-26 ENCOUNTER — TELEPHONE (OUTPATIENT)
Dept: GASTROENTEROLOGY | Age: 61
End: 2021-03-26

## 2021-03-26 NOTE — TELEPHONE ENCOUNTER
Check out for VV on 3/25/21. Writer spoke to pt over the phone and informed pt Dr Marjan Barbosa has ordered some bloods. Writer advised pt if she goes to a [address] [place] [address] facility for her blood work her lab order will be in computer in her chart and they will be able to pull her order from there. Pt states she will probably go to [facility] on [address]. Writer instructed dr ordered repeat colonoscopy and a 4 month f/u OV. Pt states she is at work at the moment and she will call us back at a later date to sched. Repeat colon is due in June and pt will 4 month f/u OV appt also.

## 2021-03-29 RX ORDER — SODIUM, POTASSIUM,MAG SULFATES 17.5-3.13G
SOLUTION, RECONSTITUTED, ORAL ORAL
Qty: 1 BOTTLE | Refills: 0 | Status: SHIPPED | OUTPATIENT
Start: 2021-03-29 | End: 2021-06-11 | Stop reason: ALTCHOICE

## 2021-03-30 NOTE — TELEPHONE ENCOUNTER
Writer called pt and left msg on pt's vm informing pt she has been sched for covid testing at [facility] 6/12/21 @ 10am. Left msg instructing pt where to report. PAT phone call is sched 6/2/21 @ 4pm and left msg instructing pt she will receive a call from surgery nurse to go over pre-testing questions.

## 2021-04-07 ENCOUNTER — HOSPITAL ENCOUNTER (OUTPATIENT)
Age: 61
Setting detail: SPECIMEN
Discharge: HOME | End: 2021-04-07
Payer: COMMERCIAL

## 2021-04-07 DIAGNOSIS — D12.6 SERRATED ADENOMA OF COLON: ICD-10-CM

## 2021-04-07 LAB
ABSOLUTE EOS #: 0.08 K/UL (ref 0–0.44)
ABSOLUTE IMMATURE GRANULOCYTE: <0.03 K/UL (ref 0–0.3)
ABSOLUTE LYMPH #: 2.66 K/UL (ref 1.1–3.7)
ABSOLUTE MONO #: 0.32 K/UL (ref 0.1–1.2)
ALBUMIN SERPL-MCNC: 4.5 G/DL (ref 3.5–5.2)
ALBUMIN/GLOBULIN RATIO: 1.4 (ref 1–2.5)
ALP BLD-CCNC: 83 U/L (ref 35–104)
ALT SERPL-CCNC: 23 U/L (ref 5–33)
ANION GAP SERPL CALCULATED.3IONS-SCNC: 17 MMOL/L (ref 9–17)
AST SERPL-CCNC: 25 U/L
BASOPHILS # BLD: 1 % (ref 0–2)
BASOPHILS ABSOLUTE: 0.03 K/UL (ref 0–0.2)
BILIRUB SERPL-MCNC: 0.27 MG/DL (ref 0.3–1.2)
BILIRUBIN DIRECT: 0.09 MG/DL
BILIRUBIN, INDIRECT: 0.18 MG/DL (ref 0–1)
BUN BLDV-MCNC: 11 MG/DL (ref 8–23)
CALCIUM SERPL-MCNC: 9.2 MG/DL (ref 8.6–10.4)
CHLORIDE BLD-SCNC: 101 MMOL/L (ref 98–107)
CO2: 23 MMOL/L (ref 20–31)
CREAT SERPL-MCNC: 0.62 MG/DL (ref 0.5–0.9)
DIFFERENTIAL TYPE: NORMAL
EOSINOPHILS RELATIVE PERCENT: 1 % (ref 1–4)
GFR AFRICAN AMERICAN: >60 ML/MIN
GFR NON-AFRICAN AMERICAN: >60 ML/MIN
GFR SERPL CREATININE-BSD FRML MDRD: ABNORMAL ML/MIN/{1.73_M2}
GFR SERPL CREATININE-BSD FRML MDRD: ABNORMAL ML/MIN/{1.73_M2}
GLUCOSE BLD-MCNC: 67 MG/DL (ref 70–99)
HCT VFR BLD CALC: 43.4 % (ref 36.3–47.1)
HEMOGLOBIN: 13.2 G/DL (ref 11.9–15.1)
IMMATURE GRANULOCYTES: 0 %
LYMPHOCYTES # BLD: 42 % (ref 24–43)
MCH RBC QN AUTO: 27.9 PG (ref 25.2–33.5)
MCHC RBC AUTO-ENTMCNC: 30.4 G/DL (ref 28.4–34.8)
MCV RBC AUTO: 91.8 FL (ref 82.6–102.9)
MONOCYTES # BLD: 5 % (ref 3–12)
NRBC AUTOMATED: 0 PER 100 WBC
PDW BLD-RTO: 13.7 % (ref 11.8–14.4)
PLATELET # BLD: 327 K/UL (ref 138–453)
PLATELET ESTIMATE: NORMAL
PMV BLD AUTO: 10.3 FL (ref 8.1–13.5)
POTASSIUM SERPL-SCNC: 4.7 MMOL/L (ref 3.7–5.3)
RBC # BLD: 4.73 M/UL (ref 3.95–5.11)
RBC # BLD: NORMAL 10*6/UL
SEG NEUTROPHILS: 51 % (ref 36–65)
SEGMENTED NEUTROPHILS ABSOLUTE COUNT: 3.23 K/UL (ref 1.5–8.1)
SODIUM BLD-SCNC: 141 MMOL/L (ref 135–144)
TOTAL PROTEIN: 7.8 G/DL (ref 6.4–8.3)
WBC # BLD: 6.3 K/UL (ref 3.5–11.3)
WBC # BLD: NORMAL 10*3/UL

## 2021-06-02 ENCOUNTER — HOSPITAL ENCOUNTER (OUTPATIENT)
Dept: PREADMISSION TESTING | Age: 61
Discharge: HOME | End: 2021-06-06
Payer: COMMERCIAL

## 2021-06-02 VITALS — HEIGHT: 66 IN | BODY MASS INDEX: 25.71 KG/M2 | WEIGHT: 160 LBS

## 2021-06-02 NOTE — PROGRESS NOTES
Pre-op Instructions For Out-Patient Endoscopy Surgery    Medication Instructions:  · Please stop herbs and any supplements now (includes vitamins and minerals). · Please contact your surgeon and prescribing physician for pre-op instructions for any blood thinners. · If you have inhalers/aerosol treatments at home, please use them the morning of your surgery and bring the inhalers with you to the hospital.    · Please take the following medications the morning of your surgery with a sip of water:  None. Surgery Instructions:  1. After midnight before surgery:  Do not eat or drink anything, including water, mints, gum, and hard candy. You may brush your teeth without swallowing. No smoking, chewing tobacco, or street drugs. 2. Please shower or bathe before surgery. 3. Please do not wear any cologne, lotion, powder, jewelry, piercings, perfume, makeup, nail polish, hair accessories, or hair spray on the day of surgery. Wear loose comfortable clothing. 4. Leave your valuables at home. Bring a storage case for any glasses/contacts. 5. An adult who is responsible for you MUST drive you home and should be with you for the first 24 hours after surgery. The Day of Surgery:  · Arrive at [facility] Surgery Entrance at the time directed by your surgeon and check in at the desk. · If you have a living will or healthcare power of , please bring a copy. · You will be taken to the pre-op holding area where you will be prepared for surgery. A physical assessment will be performed by a nurse practitioner or house officer. Your IV will be started and you will meet your anesthesiologist.    · We are currently limiting visitors to only one designated person in the pre-op holding area. When you go to surgery, your family will be directed to the surgical waiting room, where the doctor should speak with them after your surgery.     · After surgery, you will be taken to the

## 2021-06-12 ENCOUNTER — HOSPITAL ENCOUNTER (OUTPATIENT)
Dept: LAB | Age: 61
Setting detail: SPECIMEN
Discharge: HOME | End: 2021-06-12
Payer: COMMERCIAL

## 2021-06-12 DIAGNOSIS — Z01.818 PRE-OP TESTING: Primary | ICD-10-CM

## 2021-06-15 ENCOUNTER — ANESTHESIA EVENT (OUTPATIENT)
Dept: ENDOSCOPY | Age: 61
End: 2021-06-15
Payer: COMMERCIAL

## 2021-06-15 NOTE — TELEPHONE ENCOUNTER
Pt called with questions regarding bowel prep. Pt states she ate applesauce this morning. Pt advised she needs to be on a clear liquid diet today. Pt informed she will need to purchase a bottle of magnesium citrate OTC to ensure she is cleaned out since she ate this morning. Reviewed suprep instructions with pt over phone.

## 2021-06-16 ENCOUNTER — HOSPITAL ENCOUNTER (OUTPATIENT)
Age: 61
Setting detail: OUTPATIENT SURGERY
Discharge: HOME | End: 2021-06-16
Attending: INTERNAL MEDICINE | Admitting: INTERNAL MEDICINE
Payer: COMMERCIAL

## 2021-06-16 ENCOUNTER — ANESTHESIA (OUTPATIENT)
Dept: ENDOSCOPY | Age: 61
End: 2021-06-16
Payer: COMMERCIAL

## 2021-06-16 VITALS
TEMPERATURE: 98 F | DIASTOLIC BLOOD PRESSURE: 74 MMHG | OXYGEN SATURATION: 100 % | RESPIRATION RATE: 15 BRPM | SYSTOLIC BLOOD PRESSURE: 115 MMHG | BODY MASS INDEX: 25.71 KG/M2 | HEART RATE: 59 BPM | HEIGHT: 66 IN | WEIGHT: 160 LBS

## 2021-06-16 VITALS — TEMPERATURE: 98.2 F | SYSTOLIC BLOOD PRESSURE: 125 MMHG | DIASTOLIC BLOOD PRESSURE: 77 MMHG | OXYGEN SATURATION: 98 %

## 2021-06-16 PROCEDURE — 45380 COLONOSCOPY AND BIOPSY: CPT | Performed by: INTERNAL MEDICINE

## 2021-06-16 PROCEDURE — 3609010300 HC COLONOSCOPY W/BIOPSY SINGLE/MULTIPLE: Performed by: INTERNAL MEDICINE

## 2021-06-16 PROCEDURE — 2580000003 HC RX 258: Performed by: ANESTHESIOLOGY

## 2021-06-16 PROCEDURE — 7100000001 HC PACU RECOVERY - ADDTL 15 MIN: Performed by: INTERNAL MEDICINE

## 2021-06-16 PROCEDURE — 6360000002 HC RX W HCPCS: Performed by: NURSE ANESTHETIST, CERTIFIED REGISTERED

## 2021-06-16 PROCEDURE — 7100000000 HC PACU RECOVERY - FIRST 15 MIN: Performed by: INTERNAL MEDICINE

## 2021-06-16 PROCEDURE — 3700000000 HC ANESTHESIA ATTENDED CARE: Performed by: INTERNAL MEDICINE

## 2021-06-16 PROCEDURE — 3700000001 HC ADD 15 MINUTES (ANESTHESIA): Performed by: INTERNAL MEDICINE

## 2021-06-16 PROCEDURE — 88305 TISSUE EXAM BY PATHOLOGIST: CPT

## 2021-06-16 PROCEDURE — 2580000003 HC RX 258: Performed by: NURSE ANESTHETIST, CERTIFIED REGISTERED

## 2021-06-16 PROCEDURE — 2709999900 HC NON-CHARGEABLE SUPPLY: Performed by: INTERNAL MEDICINE

## 2021-06-16 PROCEDURE — 2500000003 HC RX 250 WO HCPCS: Performed by: NURSE ANESTHETIST, CERTIFIED REGISTERED

## 2021-06-16 RX ORDER — LIDOCAINE HYDROCHLORIDE 10 MG/ML
1 INJECTION, SOLUTION EPIDURAL; INFILTRATION; INTRACAUDAL; PERINEURAL
Status: DISCONTINUED | OUTPATIENT
Start: 2021-06-16 | End: 2021-06-16 | Stop reason: HOSPADM

## 2021-06-16 RX ORDER — SODIUM CHLORIDE, SODIUM LACTATE, POTASSIUM CHLORIDE, CALCIUM CHLORIDE 600; 310; 30; 20 MG/100ML; MG/100ML; MG/100ML; MG/100ML
INJECTION, SOLUTION INTRAVENOUS CONTINUOUS PRN
Status: DISCONTINUED | OUTPATIENT
Start: 2021-06-16 | End: 2021-06-16 | Stop reason: SDUPTHER

## 2021-06-16 RX ORDER — SODIUM CHLORIDE 9 MG/ML
25 INJECTION, SOLUTION INTRAVENOUS PRN
Status: DISCONTINUED | OUTPATIENT
Start: 2021-06-16 | End: 2021-06-16 | Stop reason: HOSPADM

## 2021-06-16 RX ORDER — SODIUM CHLORIDE 0.9 % (FLUSH) 0.9 %
10 SYRINGE (ML) INJECTION PRN
Status: DISCONTINUED | OUTPATIENT
Start: 2021-06-16 | End: 2021-06-16 | Stop reason: HOSPADM

## 2021-06-16 RX ORDER — LIDOCAINE HYDROCHLORIDE 20 MG/ML
INJECTION, SOLUTION INFILTRATION; PERINEURAL PRN
Status: DISCONTINUED | OUTPATIENT
Start: 2021-06-16 | End: 2021-06-16 | Stop reason: SDUPTHER

## 2021-06-16 RX ORDER — SODIUM CHLORIDE 0.9 % (FLUSH) 0.9 %
10 SYRINGE (ML) INJECTION EVERY 12 HOURS SCHEDULED
Status: DISCONTINUED | OUTPATIENT
Start: 2021-06-16 | End: 2021-06-16 | Stop reason: HOSPADM

## 2021-06-16 RX ORDER — SODIUM CHLORIDE, SODIUM LACTATE, POTASSIUM CHLORIDE, CALCIUM CHLORIDE 600; 310; 30; 20 MG/100ML; MG/100ML; MG/100ML; MG/100ML
INJECTION, SOLUTION INTRAVENOUS CONTINUOUS
Status: DISCONTINUED | OUTPATIENT
Start: 2021-06-16 | End: 2021-06-16 | Stop reason: HOSPADM

## 2021-06-16 RX ORDER — PROPOFOL 10 MG/ML
INJECTION, EMULSION INTRAVENOUS CONTINUOUS PRN
Status: DISCONTINUED | OUTPATIENT
Start: 2021-06-16 | End: 2021-06-16 | Stop reason: SDUPTHER

## 2021-06-16 RX ADMIN — SODIUM CHLORIDE, POTASSIUM CHLORIDE, SODIUM LACTATE AND CALCIUM CHLORIDE: 600; 310; 30; 20 INJECTION, SOLUTION INTRAVENOUS at 08:44

## 2021-06-16 RX ADMIN — SODIUM CHLORIDE, POTASSIUM CHLORIDE, SODIUM LACTATE AND CALCIUM CHLORIDE: 600; 310; 30; 20 INJECTION, SOLUTION INTRAVENOUS at 09:24

## 2021-06-16 RX ADMIN — LIDOCAINE HYDROCHLORIDE 100 MG: 20 INJECTION, SOLUTION INFILTRATION; PERINEURAL at 09:24

## 2021-06-16 RX ADMIN — PROPOFOL 150 MCG/KG/MIN: 10 INJECTION, EMULSION INTRAVENOUS at 09:30

## 2021-06-16 ASSESSMENT — ENCOUNTER SYMPTOMS: STRIDOR: 0

## 2021-06-16 ASSESSMENT — PAIN - FUNCTIONAL ASSESSMENT: PAIN_FUNCTIONAL_ASSESSMENT: 0-10

## 2021-06-16 ASSESSMENT — PULMONARY FUNCTION TESTS
PIF_VALUE: 1

## 2021-06-16 ASSESSMENT — PAIN SCALES - GENERAL: PAINLEVEL_OUTOF10: 0

## 2021-06-16 NOTE — ANESTHESIA PRE PROCEDURE
Department of Anesthesiology  Preprocedure Note       Name:  Ann-Marie West   Age:  63 y.o.  :  1960                                          MRN:  [id number]         Date:  2021      Surgeon: Stephany Harvey):  Edward Perry MD    Procedure: Procedure(s):  COLONOSCOPY DIAGNOSTIC    Medications prior to admission:   Prior to Admission medications    Medication Sig Start Date End Date Taking? Authorizing Provider   simvastatin (ZOCOR) 20 MG tablet Take 1 tablet by mouth nightly 21   Andre Cosme MD   NONFORMULARY 1 tablet 4 times daily Green gold multi vit    Historical Provider, MD       Current medications:    Current Facility-Administered Medications   Medication Dose Route Frequency Provider Last Rate Last Admin    lactated ringers infusion   Intravenous Continuous Ariel Becerril MD        sodium chloride flush 0.9 % injection 10 mL  10 mL Intravenous 2 times per day Ariel Becerril MD        sodium chloride flush 0.9 % injection 10 mL  10 mL Intravenous PRN Ariel Becerril MD        0.9 % sodium chloride infusion  25 mL Intravenous PRN Ariel Becerril MD        lidocaine PF 1 % injection 1 mL  1 mL Intradermal Once PRN Ariel Becerril MD           Allergies:     Allergies   Allergen Reactions    Latex        Problem List:    Patient Active Problem List   Diagnosis Code    Hypercholesterolemia E78.00    Osteoarthritis M19.90    Asthma, mild J45.909    Chronic low back pain M54.5, G89.29    Retinitis pigmentosa H35.52    Migraine syndrome G43.909    History of herpes zoster Z86.19    Eczema L30.9    Rosacea L71.9    Spinal stenosis of lumbar region M48.061    Shoulder bursitis M75.50    Fatigue R53.83    History of colon polyps Z86.010    BMI 23.0-23.9, adult Z68.23       Past Medical History:        Diagnosis Date    Asthma, mild     Chronic low back pain     Eczema     Foot fracture, right 2020    Headache(784.0)     History of colon polyps 2015    History of herpes zoster     3.2015    Hyperlipidemia LDL goal < 130     Hyperplastic colon polyp     Migraine syndrome     Osteoarthritis     Retinitis pigmentosa     Rosacea     Shoulder bursitis     Spinal stenosis of lumbar region     Type A blood, Rh negative        Past Surgical History:        Procedure Laterality Date    BUNIONECTOMY Bilateral     small screw to rt. foot.  COLONOSCOPY  7.21.11    COLONOSCOPY N/A 12/22/2020    COLONOSCOPY POLYPECTOMY SNARE HOT BIOPSY WITH EMR AND CLIP APPLICATION performed by Seth Rodarte MD at [address] [place] [address]      as child.  KNEE ARTHROSCOPY Left     TONSILLECTOMY  1965       Social History:    Social History     Tobacco Use    Smoking status: Never Smoker    Smokeless tobacco: Never Used   Substance Use Topics    Alcohol use: No     Alcohol/week: 0.0 standard drinks                                Counseling given: Not Answered      Vital Signs (Current): There were no vitals filed for this visit.                                            BP Readings from Last 3 Encounters:   06/11/21 118/62   12/22/20 123/72   12/22/20 129/63       NPO Status:                                                                                 BMI:   Wt Readings from Last 3 Encounters:   06/11/21 163 lb (73.9 kg)   06/02/21 160 lb (72.6 kg)   12/22/20 155 lb (70.3 kg)     There is no height or weight on file to calculate BMI.    CBC:   Lab Results   Component Value Date    WBC 6.3 04/07/2021    RBC 4.73 04/07/2021    HGB 13.2 04/07/2021    HCT 43.4 04/07/2021    MCV 91.8 04/07/2021    RDW 13.7 04/07/2021     04/07/2021       CMP:   Lab Results   Component Value Date     04/07/2021    K 4.7 04/07/2021     04/07/2021    CO2 23 04/07/2021    BUN 11 04/07/2021    CREATININE 0.62 04/07/2021    GFRAA >60 04/07/2021    LABGLOM >60 04/07/2021    GLUCOSE 67 04/07/2021    PROT 7.8 04/07/2021    CALCIUM 9.2 04/07/2021    BILITOT 0.27 04/07/2021    ALKPHOS 83 04/07/2021    AST 25 04/07/2021    ALT 23 04/07/2021       POC Tests: No results for input(s): POCGLU, POCNA, POCK, POCCL, POCBUN, POCHEMO, POCHCT in the last 72 hours. Coags: No results found for: PROTIME, INR, APTT    HCG (If Applicable): No results found for: PREGTESTUR, PREGSERUM, HCG, HCGQUANT     ABGs: No results found for: PHART, PO2ART, NAT8YHJ, ZRB2QQB, BEART, C6RNHLGJ     Type & Screen (If Applicable):  No results found for: LABABO, LABRH    Drug/Infectious Status (If Applicable):  No results found for: HIV, HEPCAB    COVID-19 Screening (If Applicable):   Lab Results   Component Value Date    COVID19 Not Detected 12/18/2020           Anesthesia Evaluation  Patient summary reviewed and Nursing notes reviewed  Airway: Mallampati: II  TM distance: >3 FB   Neck ROM: full  Mouth opening: > = 3 FB Dental: normal exam         Pulmonary: breath sounds clear to auscultation  (+) asthma:     (-) rhonchi, wheezes, rales and stridor                           Cardiovascular:Negative CV ROS        (-) murmur, weak pulses,  friction rub, systolic click, carotid bruit,  JVD and peripheral edema      Rhythm: regular  Rate: normal                    Neuro/Psych:   (+) headaches:,             GI/Hepatic/Renal: Neg GI/Hepatic/Renal ROS            Endo/Other: Negative Endo/Other ROS   (+) : arthritis: OA and no interval change. , . Abdominal:           Vascular:                                        Anesthesia Plan      MAC     ASA 2       Induction: intravenous. Anesthetic plan and risks discussed with patient. Plan discussed with CRNA.                   Mirtha Victoria MD   6/16/2021

## 2021-06-16 NOTE — OP NOTE
Operative Note    PROCEDURE NOTE    DATE OF PROCEDURE: 6/16/2021    SURGEON: Shahnaz Villalobos MD  Facility : [facility]  ASSISTANT: None  Anesthesia: mac   PREOPERATIVE DIAGNOSIS:   Patient had serrated adenoma flat lesion in the right colon this is a follow-up exam to make sure no remanent or recurrence    POSTOPERATIVE DIAGNOSIS: as described below    OPERATION: Total colonoscopy     ANESTHESIA: Moderate Sedation    ESTIMATED BLOOD LOSS: less than 50     COMPLICATIONS: None. SPECIMENS:  Was Obtained:   Tiny sessile hyperplastic looking polyps in the rectum the larger was 4 mm I removed the tow of them with cold bx forcpes     The scar in the right colon looking very clean I cannot even see any remnant we could not even recognize it if it was not for the clip which still there  Please see the image    The patient had few diverticuli in the left colon      Minor hemorrhoids      HISTORY: The patient is a 63y.o. year old female with history of above preop diagnosis. I recommended colonoscopy with possible biopsy or polypectomy and I explained the risk, benefits, expected outcome, and alternatives to the procedure. Risks included but are not limited to bleeding, infection, respiratory distress, hypotension, and perforation of the colon and possibility of missing a lesion. The patient understands and is in agreement. The patient was counseled at length about the risks of donald Covid-19 during their perioperative period and any recovery window from their procedure. The patient was made aware that donald Covid-19  may worsen their prognosis for recovering from their procedure  and lend to a higher morbidity and/or mortality risk. All material risks, benefits, and reasonable alternatives including postponing the procedure were discussed. The patient does wish to proceed with the procedure at this time. PROCEDURE: The patient was given IV conscious sedation.   The patient's SPO2 remained above 90% throughout the procedure. The colonoscope was inserted per rectum and advanced under direct vision to the cecum without difficulty. Post sedation note : The patient's SPO2 remained above 90% throughout the procedure. the vital signs remained stable , and no immediate complication form the procedure noted, patient will be ready for d/c when criteria is met . The prep was good. Findings:  Terminal ileum: normal    Cecum/Ascending colon: abnormal:     The scar in the right colon looking very clean I cannot even see any remnant we could not even recognize it if it was not for the clip which still there  Please see the image      Transverse colon: normal    Descending/Sigmoid colon: abnormal:   The patient had few diverticuli in the left colon      Rectum/Anus: examined in normal and retroflexed positions and was abnormal: Tiny sessile hyperplastic looking polyps in the rectum the larger was 4 mm I removed the two of hem with cold bx   Minor hemorrhoids        Withdrawal Time was (minutes): 10    The colon was decompressed and the scope was removed. The patient tolerated the procedure well. Recommendations/Plan:   1. Lifestyle and dietary modifications as discussed  2. F/U Biopsies  3. F/U In OfficeYes  4. Discussed with the family  5.  Repeat colonoscopy [id number]    Electronically signed by Desean Blair MD  on 6/16/2021 at 9:58 AM

## 2021-06-16 NOTE — H&P
HISTORY and Thadde[facility] Braxton [address]       NAME:  Kavitha Ramos  MRN: [id number]   YOB: 1960   Date: 6/16/2021   Age: 63 y.o. Gender: female       COMPLAINT AND PRESENT HISTORY:   Kavitha Ramos is 63 y.o.,   female, having a Diagnostic Colonoscopy. Prior Colonoscopy was done 12/22/2020 with polyp removed. Pre diagnosis:SERRATED ADENOMA OF COLON  HPI:  Patient has hx of Colon Polyps. Pt also has no hx of Diverticulosis. Patient has positive FH of Colon Cancer in her mother' sister  Patient reports no changes in bowel habits, no constipation or diarrhea,  No GI /Rectal bleeding, experiencing red/ black/ BRBPR stools. Patient has no  history or complaining of abd. pain , no nausea or vomiting. Pt complain of gas, and bloating some time  after eating certain food, pt denies any weight loss. Patient denies any Dysphagia. Pt has no hx of GERD   Rojas's Esophagus. Pt is on a PPI./ Antacid, that is helping to control symptoms. Pt denies fever/chills, chest pain or SOB, no palpitation or dizziness    Review of additional significant medical hx:  Asthma, Hyperlipidemia   currently pt is not on any medication     Lab Results   Component Value Date    WBC 6.3 04/07/2021    HGB 13.2 04/07/2021    HCT 43.4 04/07/2021    MCV 91.8 04/07/2021     04/07/2021     Lab Results   Component Value Date     04/07/2021    K 4.7 04/07/2021     04/07/2021    CO2 23 04/07/2021    BUN 11 04/07/2021    CREATININE 0.62 04/07/2021    GLUCOSE 67 04/07/2021    CALCIUM 9.2 04/07/2021        NPO status: pt Npo since the past midnight,   Medications taken TODAY (with sip of water): NONE  Anticoagulation status: none  Prep fully completed: YES. Pt reports her bowel movement today is liquid  Denies personal hx of blood clots. Denies personal hx of MRSA infection. Denies any personal or family hx of previous complications w/anesthesia.     PAST MEDICAL HISTORY     Past Medical History:   Diagnosis Date    Asthma, mild     Chronic low back pain     Eczema     Foot fracture, right 09/2020    Headache(784.0)     History of colon polyps 7/16/2015    History of herpes zoster     3.2015    Hyperlipidemia LDL goal < 130     Hyperplastic colon polyp     Migraine syndrome     Osteoarthritis     Retinitis pigmentosa     Rosacea     Shoulder bursitis     Spinal stenosis of lumbar region     Type A blood, Rh negative        SURGICAL HISTORY       Past Surgical History:   Procedure Laterality Date    BUNIONECTOMY Bilateral     small screw to rt. foot.  COLONOSCOPY  7.21.11    COLONOSCOPY N/A 12/22/2020    COLONOSCOPY POLYPECTOMY SNARE HOT BIOPSY WITH EMR AND CLIP APPLICATION performed by Desean Blair MD at [address] [place] [address]      as child.     KNEE ARTHROSCOPY Left     TONSILLECTOMY  1965       FAMILY HISTORY       Family History   Problem Relation Age of Onset    Breast Cancer Other     Other Other         lymphoma    High Cholesterol Mother     Asthma Father     High Cholesterol Father     Other Father         transient ischemic attack    Other Maternal Grandmother         pulm fib/familial tremor       SOCIAL HISTORY       Social History     Socioeconomic History    Marital status: Single     Spouse name: Not on file    Number of children: Not on file    Years of education: Not on file    Highest education level: Not on file   Occupational History    Not on file   Tobacco Use    Smoking status: Never Smoker    Smokeless tobacco: Never Used   Vaping Use    Vaping Use: Never used   Substance and Sexual Activity    Alcohol use: No     Alcohol/week: 0.0 standard drinks    Drug use: No    Sexual activity: Not on file   Other Topics Concern    Not on file   Social History Narrative    Not on file     Social Determinants of Health     Financial Resource Strain:     Difficulty of Paying Living Expenses:    Food Posterior tibial pulses are 2+ on the right side and 2+ on the left side. Heart sounds: Normal heart sounds. Pulmonary:      Effort: Pulmonary effort is normal.      Breath sounds: No wheezing or rhonchi. Abdominal:      General: Bowel sounds are normal.      Palpations: There is no mass. Tenderness: There is no abdominal tenderness. Musculoskeletal:         General: No tenderness or deformity. Normal range of motion. Cervical back: Normal range of motion and neck supple. Skin:     General: Skin is warm and dry. Neurological:      General: No focal deficit present. Mental Status: She is alert and oriented to person, place, and time.    Psychiatric:         Mood and Affect: Mood normal.         Behavior: Behavior normal.                   PROVISIONAL DIAGNOSES / SURGERY:    SERRATED ADENOMA OF COLON  COLONOSCOPY DIAGNOSTIC  Patient Active Problem List    Diagnosis Date Noted    BMI 23.0-23.9, adult 03/09/2018    Fatigue 07/16/2015    History of colon polyps 07/16/2015    Asthma, mild     Chronic low back pain     Retinitis pigmentosa     Migraine syndrome     History of herpes zoster     Eczema     Rosacea     Spinal stenosis of lumbar region     Shoulder bursitis     Hypercholesterolemia     Osteoarthritis            OSCAR Parry CNP on 6/16/2021 at 7:58 AM

## 2021-06-17 LAB — SURGICAL PATHOLOGY REPORT: NORMAL

## 2021-06-28 NOTE — PROGRESS NOTES
GI CLINIC FOLLOW UP    INTERVAL HISTORY:   No referring provider defined for this encounter. Chief Complaint   Patient presents with    Follow-up     Patient is f/u on colonoscopy           HISTORY OF PRESENT ILLNESS: Vladimir Frost is a 63 y.o. female , referred for evaluation of*serrated adenoma   Here for f/u   She had a history of serrated adenoma for which last visit we reviewed and decided to repeat the colonoscopy to follow on the polypectomy site   f/u colonoscopy done, results are as below with tiny sessile hyperplastic polyps removed, the scar was looking very clean I could recognize it only because the clip on it. She did have some diverticulosis and some minor hemorrhoids  She is asymptomatic at this time she denied any new issue  No N/v   no abd pain   no melena/hematemsis/hematochezia  No dysphagia/odynophagia   no wt loss   no diarrhea /contipation           The prep was good.       Findings:  Terminal ileum: normal     Cecum/Ascending colon: abnormal:      The scar in the right colon looking very clean I cannot even see any remnant we could not even recognize it if it was not for the clip which still there  Please see the image      Transverse colon: normal     Descending/Sigmoid colon: abnormal:   The patient had few diverticuli in the left colon      Rectum/Anus: examined in normal and retroflexed positions and was abnormal: Tiny sessile hyperplastic looking polyps in the rectum the larger was 4 mm I removed the two of hem with cold bx   Minor hemorrhoids      Withdrawal Time was (minutes): 10     The colon was decompressed and the scope was removed. The patient tolerated the procedure well.      Recommendations/Plan:   1. Lifestyle and dietary modifications as discussed  2. F/U Biopsies  3. F/U In OfficeYes  4. Discussed with the family  5.  Repeat colonoscopy [id number]     Electronically signed by Ana María Rolon MD  on 6/16/2021 at 9:58 AM       Final Diagnosis   RECTUM, fib/familial tremor         SOCIAL HISTORY:   Social History     Socioeconomic History    Marital status: Single     Spouse name: Not on file    Number of children: Not on file    Years of education: Not on file    Highest education level: Not on file   Occupational History    Not on file   Tobacco Use    Smoking status: Never Smoker    Smokeless tobacco: Never Used   Vaping Use    Vaping Use: Never used   Substance and Sexual Activity    Alcohol use: No     Alcohol/week: 0.0 standard drinks    Drug use: No    Sexual activity: Not on file   Other Topics Concern    Not on file   Social History Narrative    Not on file     Social Determinants of Health     Financial Resource Strain:     Difficulty of Paying Living Expenses:    Food Insecurity:     Worried About Running Out of Food in the Last Year:     Ran Out of Food in the Last Year:    Transportation Needs:     Lack of Transportation (Medical):  Lack of Transportation (Non-Medical):    Physical Activity:     Days of Exercise per Week:     Minutes of Exercise per Session:    Stress:     Feeling of Stress :    Social Connections:     Frequency of Communication with Friends and Family:     Frequency of Social Gatherings with Friends and Family:     Attends [employer] Services:     Active Member of Clubs or Organizations:     Attends Club or Organization Meetings:     Marital Status:    Intimate Partner Violence:     Fear of Current or Ex-Partner:     Emotionally Abused:     Physically Abused:     Sexually Abused:        REVIEW OF SYSTEMS: A 12-point review of systemswas obtained and pertinent positives and negatives were enumerated above in the history of present illness. All other reviewed systems / symptoms were negative. Review of Systems   Constitutional: Negative for appetite change, fatigue and unexpected weight change. HENT: Negative for trouble swallowing. Respiratory: Positive for cough (asthma).  Negative for choking Eyes:      General: No scleral icterus. Pupils: Pupils are equal, round, and reactive to light. Neck:      Thyroid: No thyromegaly. Vascular: No JVD. Trachea: No tracheal deviation. Cardiovascular:      Rate and Rhythm: Normal rate and regular rhythm. Heart sounds: Normal heart sounds. No murmur heard. Pulmonary:      Effort: Pulmonary effort is normal. No respiratory distress. Breath sounds: Normal breath sounds. No wheezing. Abdominal:      General: Bowel sounds are normal. There is no distension. Palpations: Abdomen is soft. Tenderness: There is no abdominal tenderness. There is no guarding or rebound. Comments: No ascites   Musculoskeletal:         General: Normal range of motion. Cervical back: Normal range of motion and neck supple. Skin:     General: Skin is warm. Coloration: Skin is not pale. Findings: No erythema or rash. Comments: She is not diaphoretic   Neurological:      Mental Status: She is alert and oriented to person, place, and time. Deep Tendon Reflexes: Reflexes are normal and symmetric. Psychiatric:         Behavior: Behavior normal.         Thought Content: Thought content normal.         Judgment: Judgment normal.           IMPRESSION: Ms. Arliss Phoenix is a 63 y.o. female with      Diagnosis Orders   1. Serrated adenoma of colon     2. History of colon polyps     3. Diverticulosis     4. Other hemorrhoids       Will proceed with the repeat colonoscopy in 3 years  Diet and complications from diverticulosis and a polyp cancer sequence were explained to the patient      Diet/life style/natural hx /complication of the dx were all explained in details   Past medical, past surgical, social history, psychiatric history, medications or allergies, all reviewed and  updated    Thank you for allowing me to participate in the care of Ms. Reynolds. For any further questions please do not hesitate to contact me.     I have reviewed and agree with the ROS entered by the MA/RN. Note is dictated utilizing voice recognition software. Unfortunately this leads to occasional typographical errors. Please contact our office if you have any questions.       Ana María Rolon MD  [facility]  O: #[phone]

## 2021-06-29 ENCOUNTER — OFFICE VISIT (OUTPATIENT)
Dept: GASTROENTEROLOGY | Age: 61
End: 2021-06-29
Payer: COMMERCIAL

## 2021-06-29 VITALS
WEIGHT: 164 LBS | TEMPERATURE: 96.8 F | HEART RATE: 67 BPM | BODY MASS INDEX: 26.47 KG/M2 | SYSTOLIC BLOOD PRESSURE: 126 MMHG | DIASTOLIC BLOOD PRESSURE: 75 MMHG

## 2021-06-29 DIAGNOSIS — K57.90 DIVERTICULOSIS: ICD-10-CM

## 2021-06-29 DIAGNOSIS — D12.6 SERRATED ADENOMA OF COLON: Primary | ICD-10-CM

## 2021-06-29 DIAGNOSIS — K64.8 OTHER HEMORRHOIDS: ICD-10-CM

## 2021-06-29 DIAGNOSIS — Z86.010 HISTORY OF COLON POLYPS: ICD-10-CM

## 2021-06-29 PROCEDURE — 3017F COLORECTAL CA SCREEN DOC REV: CPT | Performed by: INTERNAL MEDICINE

## 2021-06-29 PROCEDURE — 1036F TOBACCO NON-USER: CPT | Performed by: INTERNAL MEDICINE

## 2021-06-29 PROCEDURE — 99214 OFFICE O/P EST MOD 30 MIN: CPT | Performed by: INTERNAL MEDICINE

## 2021-06-29 PROCEDURE — G8427 DOCREV CUR MEDS BY ELIG CLIN: HCPCS | Performed by: INTERNAL MEDICINE

## 2021-06-29 PROCEDURE — G8419 CALC BMI OUT NRM PARAM NOF/U: HCPCS | Performed by: INTERNAL MEDICINE

## 2021-06-29 ASSESSMENT — ENCOUNTER SYMPTOMS
CONSTIPATION: 0
RECTAL PAIN: 0
DIARRHEA: 0
CHOKING: 0
ANAL BLEEDING: 0
ABDOMINAL DISTENTION: 0
WHEEZING: 0
ABDOMINAL PAIN: 0
TROUBLE SWALLOWING: 0
COUGH: 1
VOMITING: 0
NAUSEA: 0
BLOOD IN STOOL: 0

## 2021-11-23 ENCOUNTER — HOSPITAL ENCOUNTER (OUTPATIENT)
Age: 61
Setting detail: SPECIMEN
Discharge: HOME | End: 2021-11-23

## 2021-11-23 DIAGNOSIS — E78.00 HYPERCHOLESTEROLEMIA: ICD-10-CM

## 2021-11-23 LAB
ALBUMIN SERPL-MCNC: 4.5 G/DL (ref 3.5–5.2)
ALBUMIN/GLOBULIN RATIO: 1.6 (ref 1–2.5)
ALP BLD-CCNC: 82 U/L (ref 35–104)
ALT SERPL-CCNC: 21 U/L (ref 5–33)
ANION GAP SERPL CALCULATED.3IONS-SCNC: 16 MMOL/L (ref 9–17)
AST SERPL-CCNC: 21 U/L
BILIRUB SERPL-MCNC: 0.4 MG/DL (ref 0.3–1.2)
BUN BLDV-MCNC: 13 MG/DL (ref 8–23)
BUN/CREAT BLD: NORMAL (ref 9–20)
CALCIUM SERPL-MCNC: 9.3 MG/DL (ref 8.6–10.4)
CHLORIDE BLD-SCNC: 105 MMOL/L (ref 98–107)
CHOLESTEROL/HDL RATIO: 2.9
CHOLESTEROL: 195 MG/DL
CO2: 22 MMOL/L (ref 20–31)
CREAT SERPL-MCNC: 0.76 MG/DL (ref 0.5–0.9)
GFR AFRICAN AMERICAN: >60 ML/MIN
GFR NON-AFRICAN AMERICAN: >60 ML/MIN
GFR SERPL CREATININE-BSD FRML MDRD: NORMAL ML/MIN/{1.73_M2}
GFR SERPL CREATININE-BSD FRML MDRD: NORMAL ML/MIN/{1.73_M2}
GLUCOSE BLD-MCNC: 90 MG/DL (ref 70–99)
HDLC SERPL-MCNC: 67 MG/DL
LDL CHOLESTEROL: 100 MG/DL (ref 0–130)
POTASSIUM SERPL-SCNC: 4.5 MMOL/L (ref 3.7–5.3)
SODIUM BLD-SCNC: 143 MMOL/L (ref 135–144)
TOTAL PROTEIN: 7.4 G/DL (ref 6.4–8.3)
TRIGL SERPL-MCNC: 140 MG/DL
VLDLC SERPL CALC-MCNC: NORMAL MG/DL (ref 1–30)

## 2022-02-24 ENCOUNTER — OFFICE VISIT (OUTPATIENT)
Dept: PODIATRY | Age: 62
End: 2022-02-24
Payer: COMMERCIAL

## 2022-02-24 VITALS — BODY MASS INDEX: 25.71 KG/M2 | HEIGHT: 66 IN | WEIGHT: 160 LBS

## 2022-02-24 DIAGNOSIS — M79.674 PAIN IN TOE OF RIGHT FOOT: ICD-10-CM

## 2022-02-24 DIAGNOSIS — R60.0 EDEMA, LOWER EXTREMITY: ICD-10-CM

## 2022-02-24 DIAGNOSIS — S92.514A CLOSED NONDISPLACED FRACTURE OF PROXIMAL PHALANX OF LESSER TOE OF RIGHT FOOT, INITIAL ENCOUNTER: Primary | ICD-10-CM

## 2022-02-24 DIAGNOSIS — S90.121A CONTUSION OF LESSER TOE OF RIGHT FOOT WITHOUT DAMAGE TO NAIL, INITIAL ENCOUNTER: ICD-10-CM

## 2022-02-24 PROCEDURE — G8484 FLU IMMUNIZE NO ADMIN: HCPCS | Performed by: PODIATRIST

## 2022-02-24 PROCEDURE — 1036F TOBACCO NON-USER: CPT | Performed by: PODIATRIST

## 2022-02-24 PROCEDURE — 3017F COLORECTAL CA SCREEN DOC REV: CPT | Performed by: PODIATRIST

## 2022-02-24 PROCEDURE — G8419 CALC BMI OUT NRM PARAM NOF/U: HCPCS | Performed by: PODIATRIST

## 2022-02-24 PROCEDURE — 99213 OFFICE O/P EST LOW 20 MIN: CPT | Performed by: PODIATRIST

## 2022-02-24 PROCEDURE — G8427 DOCREV CUR MEDS BY ELIG CLIN: HCPCS | Performed by: PODIATRIST

## 2022-02-24 ASSESSMENT — ENCOUNTER SYMPTOMS
COLOR CHANGE: 0
DIARRHEA: 0
NAUSEA: 0
BACK PAIN: 0
SHORTNESS OF BREATH: 0

## 2022-02-24 NOTE — PROGRESS NOTES
[facility]  Return Patient Progress Note    Subjective: Chela Reynolds 57 y.o. female that presents with chief complaint of pain to the right second toe. Chief Complaint   Patient presents with    Toe Injury     patient stubbed toes right foot about 1 week ago, still having pain    Patient states that she stubbed her right foot about one week ago and she has intense pain to the second toe. Pain is rated 9 out of 10 and is described as intermittent. Patient states that she has been wearing a fracture shoe to the right foot since her injury, but she is concerned as her pain remains. Review of Systems   Constitutional: Negative for activity change, appetite change, chills, diaphoresis, fatigue and fever. Respiratory: Negative for shortness of breath. Cardiovascular: Negative for leg swelling. Gastrointestinal: Negative for diarrhea and nausea. Endocrine: Negative for cold intolerance, heat intolerance and polyuria. Musculoskeletal: Positive for arthralgias, gait problem and joint swelling. Negative for back pain and myalgias. Skin: Negative for color change, pallor, rash and wound. Allergic/Immunologic: Negative for environmental allergies and food allergies. Neurological: Negative for dizziness, weakness, light-headedness and numbness. Hematological: Does not bruise/bleed easily. Psychiatric/Behavioral: Negative for behavioral problems, confusion and self-injury. The patient is not nervous/anxious. Objective: Clinical evaluation of the patient reveals moderate edema to the right forefoot. There is mild ecchymosis to the right second toe. There is no malalignment noted to the right second toe. There is pain with palpation to the right second toe. No instability is noted to the right second toe upon manipulation, but there is pain with this. X-ray's taken: AP, Lateral, and Medial Oblique of the right foot. Findings:  There is an oblique, nondisplaced fracture noted to the proximal phalanx of the second toe. Assessment:    Diagnosis Orders   1. Closed nondisplaced fracture of proximal phalanx of lesser toe of right foot, initial encounter     2. Contusion of lesser toe of right foot without damage to nail, initial encounter  XR FOOT RIGHT (MIN 3 VIEWS)   3. Edema, lower extremity  XR FOOT RIGHT (MIN 3 VIEWS)   4. Pain in toe of right foot  XR FOOT RIGHT (MIN 3 VIEWS)         Plan: 1. Clinical evaluation of the patient. 2. Patient informed that she has a fracture to the proximal phalanx of the right second toe. I enrique splinted the second toe to the third today with coban and gauze. Patient to enrique splint this toe daily. I recommended a short CAM walker to the patient today for immobilization, but the patient states that she would prefer to continue to use her fracture shoe. Patient was instructed on proper rest, ice, compression, and elevation of the right lower extremity. 3. Return in about 4 weeks (around 3/24/2022) for evaluation of fracture with Dr. Olivia Aquino.    2/24/2022      Bailey Hernandez DPM

## 2022-03-29 ENCOUNTER — OFFICE VISIT (OUTPATIENT)
Dept: PODIATRY | Age: 62
End: 2022-03-29
Payer: COMMERCIAL

## 2022-03-29 VITALS — HEIGHT: 66 IN | BODY MASS INDEX: 25.71 KG/M2 | WEIGHT: 160 LBS

## 2022-03-29 DIAGNOSIS — R60.0 EDEMA, LOWER EXTREMITY: ICD-10-CM

## 2022-03-29 DIAGNOSIS — S90.121D CONTUSION OF LESSER TOE OF RIGHT FOOT WITHOUT DAMAGE TO NAIL, SUBSEQUENT ENCOUNTER: ICD-10-CM

## 2022-03-29 DIAGNOSIS — M79.674 PAIN IN TOE OF RIGHT FOOT: ICD-10-CM

## 2022-03-29 DIAGNOSIS — S92.514D CLOSED NONDISPLACED FRACTURE OF PROXIMAL PHALANX OF LESSER TOE OF RIGHT FOOT WITH ROUTINE HEALING, SUBSEQUENT ENCOUNTER: Primary | ICD-10-CM

## 2022-03-29 PROCEDURE — G8419 CALC BMI OUT NRM PARAM NOF/U: HCPCS | Performed by: PODIATRIST

## 2022-03-29 PROCEDURE — 99213 OFFICE O/P EST LOW 20 MIN: CPT | Performed by: PODIATRIST

## 2022-03-29 PROCEDURE — 1036F TOBACCO NON-USER: CPT | Performed by: PODIATRIST

## 2022-03-29 PROCEDURE — 3017F COLORECTAL CA SCREEN DOC REV: CPT | Performed by: PODIATRIST

## 2022-03-29 PROCEDURE — G8484 FLU IMMUNIZE NO ADMIN: HCPCS | Performed by: PODIATRIST

## 2022-03-29 PROCEDURE — G8427 DOCREV CUR MEDS BY ELIG CLIN: HCPCS | Performed by: PODIATRIST

## 2022-03-29 ASSESSMENT — ENCOUNTER SYMPTOMS
DIARRHEA: 0
SHORTNESS OF BREATH: 0
BACK PAIN: 0
NAUSEA: 0
COLOR CHANGE: 0

## 2022-03-29 NOTE — PROGRESS NOTES
[facility]  Return Patient Progress Note    Subjective: Ricardo Reynolds 57 y.o. female that presents for follow up evaluation of fracture to the right second toe. Chief Complaint   Patient presents with    Follow-up     right foot, second toe, still some tenderness    Patient's treatment thus far has included splinting of the second toe to the third and a fracture shoe. Pain is rated 2 out of 10 and is described as intermittent. Patient has been following my prescribed course of therapy as instructed. Review of Systems   Constitutional: Negative for activity change, appetite change, chills, diaphoresis, fatigue and fever. Respiratory: Negative for shortness of breath. Cardiovascular: Negative for leg swelling. Gastrointestinal: Negative for diarrhea and nausea. Endocrine: Negative for cold intolerance, heat intolerance and polyuria. Musculoskeletal: Positive for arthralgias, gait problem and joint swelling. Negative for back pain and myalgias. Skin: Negative for color change, pallor, rash and wound. Allergic/Immunologic: Negative for environmental allergies and food allergies. Neurological: Negative for dizziness, weakness, light-headedness and numbness. Hematological: Does not bruise/bleed easily. Psychiatric/Behavioral: Negative for behavioral problems, confusion and self-injury. The patient is not nervous/anxious. Objective: Clinical evaluation of the patient reveals only mild remaining edema to the right forefoot. There is no remaining ecchymosis to the right second toe. There is no malalignment noted to the right second toe. There remains pain with palpation to the right second toe. No instability is noted to the right second toe upon manipulation, but there is pain with this. X-ray's taken: AP, Lateral, and Medial Oblique of the right foot. Findings: There remains an oblique, nondisplaced fracture noted to the proximal phalanx of the second toe.  There is osseous bridging noted to the fracture site, but this is inadequate at this time. Assessment:    Diagnosis Orders   1. Closed nondisplaced fracture of proximal phalanx of lesser toe of right foot with routine healing, subsequent encounter  XR FOOT RIGHT (MIN 3 VIEWS)   2. Contusion of lesser toe of right foot without damage to nail, subsequent encounter  XR FOOT RIGHT (MIN 3 VIEWS)   3. Edema, lower extremity  XR FOOT RIGHT (MIN 3 VIEWS)   4. Pain in toe of right foot  XR FOOT RIGHT (MIN 3 VIEWS)         Plan: 1. Clinical evaluation of the patient. 2. The right second toe was enrique splinted to the third with gauze between the toes and coban. Patient to continue to enrique splint the toe, but she may transition to regular shoe wear to her tolerance. 3. Return in about 2 weeks (around 4/12/2022) for evaluation of fracture right foot with xrays.    3/29/2022      Cassidy Sahu DPM

## 2022-04-12 ENCOUNTER — OFFICE VISIT (OUTPATIENT)
Dept: PODIATRY | Age: 62
End: 2022-04-12
Payer: COMMERCIAL

## 2022-04-12 VITALS — HEIGHT: 66 IN | BODY MASS INDEX: 25.71 KG/M2 | WEIGHT: 160 LBS

## 2022-04-12 DIAGNOSIS — S90.121D CONTUSION OF LESSER TOE OF RIGHT FOOT WITHOUT DAMAGE TO NAIL, SUBSEQUENT ENCOUNTER: ICD-10-CM

## 2022-04-12 DIAGNOSIS — R60.0 EDEMA, LOWER EXTREMITY: ICD-10-CM

## 2022-04-12 DIAGNOSIS — S92.514D CLOSED NONDISPLACED FRACTURE OF PROXIMAL PHALANX OF LESSER TOE OF RIGHT FOOT WITH ROUTINE HEALING, SUBSEQUENT ENCOUNTER: Primary | ICD-10-CM

## 2022-04-12 DIAGNOSIS — M79.674 PAIN IN TOE OF RIGHT FOOT: ICD-10-CM

## 2022-04-12 PROCEDURE — G8419 CALC BMI OUT NRM PARAM NOF/U: HCPCS | Performed by: PODIATRIST

## 2022-04-12 PROCEDURE — 99213 OFFICE O/P EST LOW 20 MIN: CPT | Performed by: PODIATRIST

## 2022-04-12 PROCEDURE — 1036F TOBACCO NON-USER: CPT | Performed by: PODIATRIST

## 2022-04-12 PROCEDURE — 3017F COLORECTAL CA SCREEN DOC REV: CPT | Performed by: PODIATRIST

## 2022-04-12 PROCEDURE — G8427 DOCREV CUR MEDS BY ELIG CLIN: HCPCS | Performed by: PODIATRIST

## 2022-04-12 NOTE — PROGRESS NOTES
[facility]  Return Patient Progress Note    Subjective: Willis Reynolds 57 y.o. female that presents for follow up evaluation of fracture to the right second toe. Chief Complaint   Patient presents with    Check-Up     fx follow up    Patient's treatment thus far has included splinting of the second toe to the third and regular shoe wear. Pain is rated 0 out of 10 and is described as intermittent. Patient has been following my prescribed course of therapy as instructed. Review of Systems   Constitutional: Negative for activity change, appetite change, chills, diaphoresis, fatigue and fever. Respiratory: Negative for shortness of breath. Cardiovascular: Negative for leg swelling. Gastrointestinal: Negative for diarrhea and nausea. Endocrine: Negative for cold intolerance, heat intolerance and polyuria. Musculoskeletal: Positive for arthralgias, gait problem and joint swelling. Negative for back pain and myalgias. Skin: Negative for color change, pallor, rash and wound. Allergic/Immunologic: Negative for environmental allergies and food allergies. Neurological: Negative for dizziness, weakness, light-headedness and numbness. Hematological: Does not bruise/bleed easily. Psychiatric/Behavioral: Negative for behavioral problems, confusion and self-injury. The patient is not nervous/anxious. Objective: Clinical evaluation of the patient reveals only mild remaining edema to the right forefoot. There is no remaining ecchymosis to the right second toe. There is no malalignment noted to the right second toe. There is mild pain with palpation to the right second toe. No instability is noted to the right second toe upon manipulation, but there is mild pain with this. X-ray's taken: AP, Lateral, and Medial Oblique of the right foot. Findings: There remains an oblique, nondisplaced fracture noted to the proximal phalanx of the second toe.  There is osseous bridging noted to the fracture site, but this is inadequate at this time. This does not appear to have changed since last visit. Assessment:    Diagnosis Orders   1. Closed nondisplaced fracture of proximal phalanx of lesser toe of right foot with routine healing, subsequent encounter  XR FOOT RIGHT (MIN 3 VIEWS)   2. Contusion of lesser toe of right foot without damage to nail, subsequent encounter  XR FOOT RIGHT (MIN 3 VIEWS)   3. Edema, lower extremity  XR FOOT RIGHT (MIN 3 VIEWS)   4. Pain in toe of right foot  XR FOOT RIGHT (MIN 3 VIEWS)         Plan: 1. Clinical evaluation of the patient. 2. Patient informed that even though her xrays show a fracture, since she is not having any pain with daily activity, she may discontinue enrique splinting the second toe to the third. Patient is to resume doing this if her pain worsens. 3. Return if symptoms worsen or fail to improve.    4/12/2022      Rosa Sanders DPM

## 2022-04-14 ASSESSMENT — ENCOUNTER SYMPTOMS
DIARRHEA: 0
BACK PAIN: 0
COLOR CHANGE: 0
NAUSEA: 0
SHORTNESS OF BREATH: 0

## 2022-06-13 PROBLEM — M54.30 SCIATICA: Status: ACTIVE | Noted: 2022-06-13

## 2022-07-11 DIAGNOSIS — D12.6 SERRATED ADENOMA OF COLON: ICD-10-CM

## 2024-07-22 ENCOUNTER — HOSPITAL ENCOUNTER (OUTPATIENT)
Facility: CLINIC | Age: 64
Discharge: HOME | End: 2024-07-24
Payer: COMMERCIAL

## 2024-07-22 ENCOUNTER — HOSPITAL ENCOUNTER (OUTPATIENT)
Dept: GENERAL RADIOLOGY | Facility: CLINIC | Age: 64
Discharge: HOME | End: 2024-07-24
Payer: COMMERCIAL

## 2024-07-22 DIAGNOSIS — M54.2 NECK PAIN: ICD-10-CM

## 2024-07-22 PROCEDURE — 72050 X-RAY EXAM NECK SPINE 4/5VWS: CPT

## 2024-09-23 NOTE — PROGRESS NOTES
[facility]  New Patient History and Physical    Chief Complaint:   Chief Complaint   Patient presents with    Foot Pain     right foot, 9/2/20 fell fracture        HPI: Lily Angel is a 60 y.o. female who presents to the office today complaining of right foot injury. Marcela Vail 9/2. Went to [facility]. xrays showed a fracture of the fifth metatarsal. Put her in a surgical shoe, she has been weight bearing. Pain a little better. Currently denies F/C/N/V. Allergies   Allergen Reactions    Latex        Past Medical History:   Diagnosis Date    Asthma, mild     Chronic low back pain     Eczema     Headache(784.0)     History of colon polyps 7/16/2015    History of herpes zoster     3.2015    Hyperlipidemia LDL goal < 130     Hyperplastic colon polyp     Migraine syndrome     Osteoarthritis     Retinitis pigmentosa     Rosacea     Shoulder bursitis     Spinal stenosis of lumbar region        Prior to Admission medications    Not on File       Past Surgical History:   Procedure Laterality Date    BUNIONECTOMY      CATARACT REMOVAL      COLONOSCOPY  7.21.11    DILATION AND CURETTAGE OF UTERUS      TONSILLECTOMY  1965       Family History   Problem Relation Age of Onset    Breast Cancer Other     Other Other         lymphoma    High Cholesterol Mother     Asthma Father     High Cholesterol Father     Other Father         transient ischemic attack    Other Maternal Grandmother         pulm fib    Other Maternal Grandmother         familial tremor       Social History     Tobacco Use    Smoking status: Never Smoker    Smokeless tobacco: Never Used   Substance Use Topics    Alcohol use: No     Alcohol/week: 0.0 standard drinks       Review of Systems   Constitutional: Negative for chills, diaphoresis, fatigue, fever and unexpected weight change. Cardiovascular: Negative for leg swelling. Gastrointestinal: Negative for constipation, diarrhea, nausea and vomiting. Musculoskeletal: Positive for gait problem. Negative for arthralgias and joint swelling. Skin: Negative for color change, pallor, rash and wound. Neurological: Negative for weakness and numbness. Lower Extremity Physical Examination:     Vitals: There were no vitals filed for this visit. General: AAO x 3 in NAD. Vascular: DP and PT pulses palpable 2/4, Bilateral.  CFT <3 seconds, Bilateral.  Hair growth absent to the level of the digits, Bilateral.  Edema present, Right. Varicosities absent, Bilateral. Erythema present, Right    Neurological:  Sensation present to light touch to level of digits, Bilateral.      Musculoskeletal: Muscle strength guarded/5, Right. Pain present upon palpation of base of the 5th metatarsal , Right. normal medial longitudinal arch, Bilateral.  Ecchymosis right lateral foot and ankle    Integument: Warm, dry, supple, Bilateral.  Open lesion absent, Bilateral.      Radiographs: 3 views right Foot:  Nondisplaced fracture of the base of the fifth metatarsal.      Asessment: Patient is a 60 y.o. female with:   1. Foot pain, right    2. Closed fracture of base of fifth metatarsal bone of right foot, initial encounter          Plan: Patient examined and evaluated. Current condition and treatment options discussed in detail. Advised pt to rest, ice, minimal walking. Verbal and written instructions given to patient. Contact office with any questions/problems/concerns. I have dispensed a pneumatic equalizer walker. Due to the patient's diagnosis and related symptoms this is medically necessary for the treatment. The function of this device is to restrict and limit motion and provide stabilization and compression to the affected area. This device will allow the patient to slowly increase strength and ROM of the extremity. Specific instructions on weight bearing and ROM exercises were discussed and given to the patient.  The goals and function of this device was explained in detail to the patient. Upon gait analysis, the device appeared to be fitting well and the patient states that the device is comfortable at this time. The patient was shown how to properly apply, wear, and care for the device. The patient was able to apply properly and ambulate without distress. At that time, the device was  dispensed, it was suitable for the patient's condition and was not substandard. No guarantees were given and the precautions were reviewed. Written instructions and warranty information was given along with the list of the twenty-one (21) Durable Medical Equipment Supplier Guidelines. All the questions were answered satisfactorily    Orders Placed This Encounter   Procedures    XR FOOT RIGHT (MIN 3 VIEWS)    OK PNEUMAT WALKING BOOT PRE CST     Right Foot     No orders of the defined types were placed in this encounter. RTC in 3week(s).     9/9/2020 No

## 2024-12-05 ENCOUNTER — TELEPHONE (OUTPATIENT)
Dept: GASTROENTEROLOGY | Age: 64
End: 2024-12-05

## (undated) DEVICE — SNARE ENDOSCP L240CM W15MM SHTH DIA2.4MM CHN 2.8MM STIFF

## (undated) DEVICE — POLYP TRAP: Brand: TRAPEASE®

## (undated) DEVICE — FORCEPS BX L L240CM DIA2.4MM RAD JAW 4 HOT FOR POLYP DISP

## (undated) DEVICE — SINGLE-USE POLYPECTOMY SNARE: Brand: CAPTIVATOR II

## (undated) DEVICE — ERBE NESSY® OMEGA PLATE USA (85+23)CM² , WITH CABLE 3 M: Brand: ERBE

## (undated) DEVICE — ENDO KIT W/SYRINGE: Brand: MEDLINE INDUSTRIES, INC.

## (undated) DEVICE — FORCEPS BX L240CM JAW DIA2.8MM L CAP W/ NDL MIC MESH TOOTH

## (undated) DEVICE — CLIP LIG L235CM RESOL 360 BX/20

## (undated) DEVICE — DEFENDO AIR WATER SUCTION AND BIOPSY VALVE KIT FOR  OLYMPUS: Brand: DEFENDO AIR/WATER/SUCTION AND BIOPSY VALVE

## (undated) DEVICE — FORCEP SURG SM REDUCTION GRD RATCH